# Patient Record
Sex: FEMALE | Race: WHITE | NOT HISPANIC OR LATINO | Employment: FULL TIME | URBAN - METROPOLITAN AREA
[De-identification: names, ages, dates, MRNs, and addresses within clinical notes are randomized per-mention and may not be internally consistent; named-entity substitution may affect disease eponyms.]

---

## 2017-09-07 ENCOUNTER — ALLSCRIPTS OFFICE VISIT (OUTPATIENT)
Dept: OTHER | Facility: OTHER | Age: 32
End: 2017-09-07

## 2017-09-07 LAB
EXTERNAL CHLAMYDIA SCREEN: NEGATIVE
EXTERNAL GONORRHEA SCREEN: NEGATIVE
HCG, QUALITATIVE (HISTORICAL): POSITIVE

## 2017-09-14 ENCOUNTER — GENERIC CONVERSION - ENCOUNTER (OUTPATIENT)
Dept: OTHER | Facility: OTHER | Age: 32
End: 2017-09-14

## 2017-09-14 ENCOUNTER — ALLSCRIPTS OFFICE VISIT (OUTPATIENT)
Dept: PERINATAL CARE | Facility: CLINIC | Age: 32
End: 2017-09-14
Payer: COMMERCIAL

## 2017-09-14 PROCEDURE — 76801 OB US < 14 WKS SINGLE FETUS: CPT | Performed by: OBSTETRICS & GYNECOLOGY

## 2017-09-20 ENCOUNTER — ALLSCRIPTS OFFICE VISIT (OUTPATIENT)
Dept: OTHER | Facility: OTHER | Age: 32
End: 2017-09-20

## 2017-09-20 DIAGNOSIS — Z3A.09 9 WEEKS GESTATION OF PREGNANCY: ICD-10-CM

## 2017-10-04 LAB
CFTR MUT ANL BLD/T: NEGATIVE
EXTERNAL ABO GROUPING: NORMAL
EXTERNAL ANTIBODY SCREEN: NORMAL
EXTERNAL HEMATOCRIT: 37.9 %
EXTERNAL HEMOGLOBIN: 13.1 G/DL
EXTERNAL HEPATITIS B SURFACE ANTIGEN: NEGATIVE
EXTERNAL HIV-1 ANTIBODY: NEGATIVE
EXTERNAL PLATELET COUNT: 214 K/ΜL
EXTERNAL RH FACTOR: POSITIVE
EXTERNAL RUBELLA IGG QUANTITATION: NORMAL
EXTERNAL SYPHILIS RPR SCREEN: NORMAL

## 2017-10-19 ENCOUNTER — GENERIC CONVERSION - ENCOUNTER (OUTPATIENT)
Dept: OTHER | Facility: OTHER | Age: 32
End: 2017-10-19

## 2017-10-26 NOTE — PROGRESS NOTES
Assessment  1  Amenorrhea (626 0) (N91 2)   2  Routine screening for STI (sexually transmitted infection) (V74 5) (Z11 3)    Plan  Amenorrhea    · Urine HCG- POC; Status:Resulted - Requires Verification,Retrospective By Protocol  Authorization;   Done: 76BPL0526 05:46PM   Performed: In Office; Due:89Jtz5404; Last Updated By:Flaca Bill; 9/7/2017 5:46:21 PM;Ordered; Today; For:Amenorrhea; Ordered By:Jo Priest;   · *1 - [address] and Ultrasound Only  Status:  Hold For - Scheduling  Requested for: 14Nsg8793   Ordered; For: Amenorrhea; Ordered By: Rickey Bradford Performed:  Due: 22NZC4656  Care Summary provided  : Yes   · Follow-up PRN Evaluation and Treatment  Follow-up  Status: Complete  Done:  95KFJ1893   Ordered; For: Amenorrhea; Ordered By: Rickey Bradford Performed:  Due: 85LMM7568   · Follow-up visit in 2 weeks Evaluation and Treatment  Follow-up  Status: Hold For -  Scheduling  Requested for: 32Tmf6800   Ordered; For: Amenorrhea; Ordered By: Rickey Bradford Performed:  Due: 63IAJ8294  Routine screening for STI (sexually transmitted infection)    · (Q) CHLAMYDIA/N  GONORRHOEAE DNA, SDA, PAP VIAL; Status:Active - Retrospective  By Protocol Authorization; Requested CPY:[id number];    Perform:[facility]; YRA:[id number]; Last Updated By:Flaca Bill; 9/7/2017 6:32:44 PM;Ordered; For:Routine screening for STI (sexually transmitted infection); Ordered By:Jo Priest; Discussion/Summary  Discussion Summary:   RTO in 2 weeks for FERN  Referred to [facility] for dating US and sequential screen to be done between 11 1-13 6 weeks  Will call with any worsening of nausea  Chief Complaint  Chief Complaint Free Text Note Form: Here for pregnancy confirmation  LMP 7/12/2017  History of Present Illness  HPI: New patient to office here with complaints of amenorrhea  Unplanned and acceptable  Accompanied by Lizette Chau  No health concerns   Menarche- 10, coitarche- 23 consensual, almost non consensual sex at age 25 while studying abroad  Lifetime # of sexual partners-4 both  No hx of STI  No hx of abnormal paps  LMP 7/12/17  [facility] 4/19/18  Today 8 1 weeks  Denies abnormal vaginal discharge or bleeding  Review of Systems  Focused-Female:   Constitutional: No fever, no chills, feels well, no tiredness, no recent weight gain or loss  ENT: no ear ache, no loss of hearing, no nosebleeds or nasal discharge, no sore throat or hoarseness  Cardiovascular: no complaints of slow or fast heart rate, no chest pain, no palpitations, no leg claudication or lower extremity edema  Respiratory: no complaints of shortness of breath, no wheezing, no dyspnea on exertion, no orthopnea or PND  Breasts: no complaints of breast pain, breast lump or nipple discharge  Gastrointestinal: no complaints of abdominal pain, no constipation, no nausea or diarrhea, no vomiting, no bloody stools  Genitourinary: no complaints of dysuria, no incontinence, no pelvic pain, no dysmenorrhea, no vaginal discharge or abnormal vaginal bleeding  Musculoskeletal: no complaints of arthralgia, no myalgia, no joint swelling or stiffness, no limb pain or swelling  Integumentary: no complaints of skin rash or lesion, no itching or dry skin, no skin wounds  Neurological: no complaints of headache, no confusion, no numbness or tingling, no dizziness or fainting  ROS Reviewed:   ROS reviewed  Active Problems  1  Eyelid dermatitis, eczematous (373 31) (H01 139)    Past Medical History  1  No pertinent past medical history  Active Problems And Past Medical History Reviewed: The active problems and past medical history were reviewed and updated today  Surgical History  1  History of Dental Surgery  Surgical History Reviewed: The surgical history was reviewed and updated today  Family History  Mother    1  Family history of Living and Healthy  Father    2   Family history of Living and Healthy  Maternal Grandmother    3  Family history of Cancer, colon  Family History Reviewed: The family history was reviewed and updated today  No family hx of breast or ovarian cancer  Social History   · Alcohol use (V49 89) (Z78 9)   · Caffeine use (V49 89) (F15 90)   · Daily caffeine consumption, 1 serving a day   · Exercises 5 to 6 times per week (V49 89) (Z78 9)   · Full-time employment   ·    · Never a smoker   · No drug use  Social History Reviewed: The social history was reviewed and updated today  Current Meds  Medication List Reviewed: The medication list was reviewed and updated today  Allergies  1  No Known Drug Allergies    Vitals  Vital Signs    Recorded: 10TNZ2331 22:13SH   Systolic 729, RUE, Sitting   Diastolic 60, RUE, Sitting   Height 5 ft 1 25 in   Weight 125 lb 6 4 oz   BMI Calculated 23 5   BSA Calculated 1 55   LMP 12MTH0726     Physical Exam    Constitutional   General appearance: No acute distress, well appearing and well nourished  Neck   Neck: Normal, supple, trachea midline, no masses  Thyroid: Normal, no thyromegaly  Pulmonary   Respiratory effort: No increased work of breathing or signs of respiratory distress  Auscultation of lungs: Clear to auscultation  Cardiovascular   Auscultation of heart: Normal rate and rhythm, normal S1 and S2, no murmurs  Peripheral vascular exam: Normal pulses Throughout  Genitourinary   External genitalia: Normal and no lesions appreciated  Vagina: Normal, no lesions or dryness appreciated  Urethra: Normal     Urethral meatus: Normal     Bladder: Normal, soft, non-tender and no prolapse or masses appreciated  Cervix: Normal, no palpable masses  -- GC/CT done  Uterus: Normal, non-tender, not enlarged, and no palpable masses  -- AV approx 9 weeks  Adnexa/parametria: Normal, non-tender and no fullness or masses appreciated  -- NT NP     Anus, perineum, and rectum: Normal sphincter tone, no masses, and no prolapse  Visually normal   Chest deferred  Abdomen   Abdomen: Normal, non-tender, and no organomegaly noted  Liver and spleen: No hepatomegaly or splenomegaly  Examination for hernias: No hernias appreciated  Lymphatic   Palpation of lymph nodes in neck, axillae, groin and/or other locations: No lymphadenopathy or masses noted  Skin   Skin and subcutaneous tissue: Normal skin turgor and no rashes  Palpation of skin and subcutaneous tissue: Normal     Psychiatric   Orientation to person, place, and time: Normal     Mood and affect: Normal        Results/Data  Urine HCG- POC 67SZA8625 05:46PM Joe Pino     Test Name Result Flag Reference   Urine HCG Positive                     Future Appointments    Date/Time Provider Specialty Site   09/20/2017 03:00 PM SIDRA Mckeon Obstetrics/Gynecology [facility] OB[facility]  [facility]   Electronically signed by :  SIDRA Donovan; Sep  7 2017  6:44PM EST                       (Author)    Electronically signed by : NARENDRA Del Cid ; Sep  7 2017  6:51PM EST                       (Author)

## 2017-11-16 ENCOUNTER — GENERIC CONVERSION - ENCOUNTER (OUTPATIENT)
Dept: OTHER | Facility: OTHER | Age: 32
End: 2017-11-16

## 2017-12-04 ENCOUNTER — APPOINTMENT (OUTPATIENT)
Dept: PERINATAL CARE | Facility: CLINIC | Age: 32
End: 2017-12-04
Payer: COMMERCIAL

## 2017-12-04 ENCOUNTER — GENERIC CONVERSION - ENCOUNTER (OUTPATIENT)
Dept: OTHER | Facility: OTHER | Age: 32
End: 2017-12-04

## 2017-12-04 PROCEDURE — 76811 OB US DETAILED SNGL FETUS: CPT | Performed by: OBSTETRICS & GYNECOLOGY

## 2017-12-04 PROCEDURE — 76817 TRANSVAGINAL US OBSTETRIC: CPT | Performed by: OBSTETRICS & GYNECOLOGY

## 2017-12-14 ENCOUNTER — GENERIC CONVERSION - ENCOUNTER (OUTPATIENT)
Dept: OTHER | Facility: OTHER | Age: 32
End: 2017-12-14

## 2017-12-29 ENCOUNTER — APPOINTMENT (OUTPATIENT)
Dept: PERINATAL CARE | Facility: CLINIC | Age: 32
End: 2017-12-29
Payer: COMMERCIAL

## 2017-12-29 ENCOUNTER — GENERIC CONVERSION - ENCOUNTER (OUTPATIENT)
Dept: OTHER | Facility: OTHER | Age: 32
End: 2017-12-29

## 2017-12-29 PROCEDURE — 76816 OB US FOLLOW-UP PER FETUS: CPT | Performed by: OBSTETRICS & GYNECOLOGY

## 2018-01-02 ENCOUNTER — OB ABSTRACT (OUTPATIENT)
Dept: GYNECOLOGY | Facility: CLINIC | Age: 33
End: 2018-01-02

## 2018-01-02 PROBLEM — O09.899 RUBELLA NON-IMMUNE STATUS, ANTEPARTUM: Status: ACTIVE | Noted: 2018-01-02

## 2018-01-02 PROBLEM — Z28.39 RUBELLA NON-IMMUNE STATUS, ANTEPARTUM: Status: ACTIVE | Noted: 2018-01-02

## 2018-01-02 RX ORDER — ACETAMINOPHEN 500 MG
500 TABLET ORAL EVERY 6 HOURS PRN
COMMUNITY
End: 2021-03-04 | Stop reason: ALTCHOICE

## 2018-01-10 NOTE — MISCELLANEOUS
Message  Return to work or school:   Margret Watson is under my professional care  She was seen in my office on 9/20/2017   Germán Willett is able to participate in prenatal yoga classes  If any questions, please feel free to call the office  Thank you,             Signatures  Familia Barrera, MSN, CRNP    Electronically signed by :  SIDRA Franco; Sep 25 2017  4:51PM EST                       (Author)

## 2018-01-10 NOTE — PROGRESS NOTES
SEP 14 2017         RE: Jamie Davis                                To: BERRY Delgado    MR#: [id number]                                   [address]   : [address]: [id number]                             Mariel Cantu U  6    (Exam #: [id number])                           Fax: [phone]      The LMP of this 36year old,  G1, P0-0-0-0 patient was 2017, giving   her an CARLOS A of 2018 and a current gestational age of 10 weeks 1 day   by dates  A sonographic examination was performed on SEP 14 2017 using   real time equipment  The ultrasound examination was performed using   abdominal technique  The patient has a BMI of 23 6  Her blood pressure   today was 124/75  Earliest US on record: 17  8w6d  18 CARLOS A      Billy Ruiz is on prenatal vitamins and denies any allergies to medication or   any use of cigarettes, alcohol or illicit drugs  She denies any   significant past medical, past surgical or first generation family history   of diabetes, hypertension, thrombosis or congenital anomalies  She is here   today for a dating scan  Multiple longitudinal and transverse sections revealed a saucedo   intrauterine pregnancy  A normal gestational sac was documented  A normal fetal pole was   visualized  Cardiac motion was observed at 173 bpm  The yolk sac was seen,   measuring 0 45 cm  The amnion was also documented  INDICATIONS      pregnancy dating      Exam Types      Level I      RESULTS      Fetus # 1 of 1   Fetal growth appeared normal      MEASUREMENTS (* Included In Average GA)      CRL              2 3 cm        8 weeks 6 days *      THE AVERAGE GESTATIONAL AGE is 8 weeks 6 days +/- 5 days  ADNEXA      The left ovary was enlarged and measured 5 8 x 4 7 x 4 2 cm with a volume   of 59 9 cc   The right ovary appeared normal and measured 2 6 x 2 3 x 1 4   cm with a volume of 4 4 cc       MASSES      1)  Left ovarian cystic mass  The cyst measured 4 50 x 3 30 x 4 60 cm,   simple in appearance with a smooth lining  Sonolucent fluid  Color doppler   flow was performed  This resembles a physiologic corpus luteum cyst       AMNIOTIC FLUID         Largest Vertical Pocket = 2 4 cm      IMPRESSION      Berry IUP   8 weeks and 6 days by this ultrasound  (CARLOS A=APR 20 2018)   Fetal growth appeared normal   Regular fetal heart rate of 173 bpm      GENERAL COMMENT      She is interested in genetic screening if her insurance covers it  My   staff gave her printed pamphlets on the sequential screen and information   needed to contact her insurance to assess if this is covered  She was   scheduled to return for a 12 week ultrasound for nuchal translucency and a   20 week dating scan  SAADIA De La Cruz M D     Maternal-Fetal Medicine   Electronically signed 09/14/17 19:19

## 2018-01-13 VITALS
DIASTOLIC BLOOD PRESSURE: 60 MMHG | HEIGHT: 61 IN | SYSTOLIC BLOOD PRESSURE: 120 MMHG | WEIGHT: 125.4 LBS | BODY MASS INDEX: 23.68 KG/M2

## 2018-01-13 VITALS
WEIGHT: 125 LBS | DIASTOLIC BLOOD PRESSURE: 75 MMHG | HEIGHT: 61 IN | BODY MASS INDEX: 23.6 KG/M2 | SYSTOLIC BLOOD PRESSURE: 124 MMHG

## 2018-01-15 VITALS
SYSTOLIC BLOOD PRESSURE: 116 MMHG | DIASTOLIC BLOOD PRESSURE: 62 MMHG | WEIGHT: 126.13 LBS | HEIGHT: 61 IN | BODY MASS INDEX: 23.81 KG/M2

## 2018-01-16 ENCOUNTER — GENERIC CONVERSION - ENCOUNTER (OUTPATIENT)
Dept: OTHER | Facility: OTHER | Age: 33
End: 2018-01-16

## 2018-01-16 DIAGNOSIS — Z3A.26 26 WEEKS GESTATION OF PREGNANCY: ICD-10-CM

## 2018-01-22 VITALS
BODY MASS INDEX: 25.77 KG/M2 | SYSTOLIC BLOOD PRESSURE: 122 MMHG | HEART RATE: 82 BPM | DIASTOLIC BLOOD PRESSURE: 64 MMHG | HEIGHT: 61 IN | WEIGHT: 136.5 LBS

## 2018-01-22 VITALS
SYSTOLIC BLOOD PRESSURE: 128 MMHG | OXYGEN SATURATION: 99 % | HEIGHT: 61 IN | DIASTOLIC BLOOD PRESSURE: 74 MMHG | HEART RATE: 74 BPM | WEIGHT: 130.25 LBS | BODY MASS INDEX: 24.59 KG/M2

## 2018-01-23 NOTE — PROGRESS NOTES
DEC 29 2017         RE: Ena Mcmanus                                To: [facility]   MR#: [id number]                                   [facility]  : Jose Elias: [id number]                             [facility], [address]   (Exam #: [id number])                           Fax: [phone]      The LMP of this 29year old,  G1, P0-0-0-0 patient was 2017, giving   her an CARLOS A of 2018 and a current gestational age of 23 weeks 2 days   by dates  A sonographic examination was performed on DEC 29 2017 using   real time equipment  The ultrasound examination was performed using   abdominal technique  The patient has a BMI of 26 9  Her blood pressure   today was 131/81, with a pulse of 90 bpm       Earliest US on record: 17  8w6d  18 CARLOS A      Cardiac motion was observed at 145 bpm       INDICATIONS      missed anatomy follow up      Exam Types      Level I      RESULTS      Fetus # 1 of 1   Breech presentation   Placenta Location = Posterior   No placenta previa   Placenta Grade = I      AMNIOTIC FLUID      Q1: 4 0      Q2: 7 1      Q3: 3 0      Q4: 1 9   THA Total = 16 0 cm   Amniotic Fluid: Normal      ANATOMY COMMENTS      The prior fetal anatomic survey was limited the area of the profile and   cavum  These anatomic views were seen today as sonographically normal   within the inherent limitations of fetal ultrasound and the anatomic   survey is now complete  IMPRESSION      Berry IUP   Breech presentation   Regular fetal heart rate of 145 bpm   Posterior placenta   No placenta previa      GENERAL COMMENT      Ms Olivia Lezama is here for followup anatomy  There is a single live intrauterine pregnancy  No gross anomalies were identified on limited views  Specifically the   cavum is seen as sonographically normal as well as the profile and chin        Amniotic fluid is within normal limits  Evaluation and Management:   The patient was counseled regarding the above findings  A total of 10   minutes were spent in this encounter with >50% of the time spent in   face-to-face counseling and in coordination of care  The limitations of   ultrasound were reviewed  I reviewed that my suspicion today for any structural abnormality is very   low  She can continue pregnancy with expectant management without further   ultrasounds unless an additional indication or concern arises  At the conclusion of today's encounter, all questions were answered to her   satisfaction  Thank you very much for this kind referral and please do   not hesitate to contact me with any further questions or concerns  SAADIA Ivy M D     Maternal Fetal Medicine   Electronically signed 12/29/17 12:55

## 2018-01-23 NOTE — PROGRESS NOTES
DEC 4 2017         RE: Benito Gamble                                To: Cleo Roque,   N ODESSA    MR#: [id number]                                   [address]   : [address]: [id number]:[facility]                             Mariel Cantu U  6    (Exam #: [id number])                           Fax: [phone]      The LMP of this 29year old,  G1, P0-0-0-0 patient was 2017, giving   her an CARLOS A of 2018 and a current gestational age of 25 weeks 5 days   by dates  A sonographic examination was performed on DEC 4 2017 using real   time equipment  The ultrasound examination was performed using abdominal &   vaginal techniques  The patient has a BMI of 25 6  Her blood pressure   today was 123/76  Earliest US on record: 17  8w6d  18 CARLOS A      Cardiac motion was observed at 149 bpm       INDICATIONS      fetal anatomical survey      Exam Types      LEVEL II   Transvaginal      RESULTS      Fetus # 1 of 1   Vertex presentation   Fetal growth appeared normal   Placenta Location = Posterior   Placenta Grade = II      MEASUREMENTS (* Included In Average GA)      AC              15 9 cm        20 weeks 5 days* (50%)   BPD              4 9 cm        20 weeks 5 days* (48%)   HC              18 4 cm        20 weeks 5 days* (47%)   Femur            3 3 cm        20 weeks 4 days* (39%)      Nuchal Fold      3 2 mm   NBL              6 2 mm      Humerus          3 3 cm        21 weeks 1 day   (57%)      Cerebellum       2 1 cm        20 weeks 6 days   Biorbit          3 2 cm        20 weeks 6 days   CisternaMagna    7 6 mm      HC/AC           1 16   FL/AC           0 21   FL/BPD          0 68   EFW (Ac/Fl/Hc)   369 grams - 0 lbs 13 oz      THE AVERAGE GESTATIONAL AGE is 20 weeks 5 days +/- 10 days        AMNIOTIC FLUID         Largest Vertical Pocket = 5 5 cm   Amniotic Fluid: Normal      CERVICAL EVALUATION      The cervix appeared normal (Ultrasound Examination)  SUPINE      Cervical Length: 3 49 cm      OTHER TEST RESULTS           Funneling?: No             Dynamic Changes?: No        Resp  To TFP?: No      ANATOMY      Head                                    See Details   Face/Neck                               Normal   Th  Cav  Normal   Heart                                   Normal   Abd  Cav  Normal   Stomach                                 Normal   Right Kidney                            Normal   Left Kidney                             Normal   Bladder                                 Normal   Abd  Wall                               Normal   Spine                                   Normal   Extrems                                 Normal   Genitalia                               Normal   Placenta                                Normal   Umbl  Cord                              Normal   Uterus                                  Normal   PCI                                     Normal      ANATOMY DETAILS      Visualized Appearing Sonographically Normal:   HEAD: (Calvarium, BPD Level, Lateral Ventricles, Choroid Plexus,   Cerebellum, Cisterna Magna);    FACE/NECK: (Neck, Nuchal Fold, Profile,   Orbits, Nose/Lips, Palate, Face);    TH  CAV  : (Lungs, Diaphragm); HEART: (Four Chamber View, Proximal Left Outflow, Proximal Right Outflow,   3VV, 3 Vessel Trachea, Short Axis of Greater Vessels, Ductal Arch, Aortic   Arch, Interventricular Septum, Interatrial Septum, IVC, SVC, Cardiac Axis,   Cardiac Position);    ABD  CAV : (Liver);    STOMACH, RIGHT KIDNEY, LEFT   KIDNEY, BLADDER, ABD  WALL, SPINE: (Cervical Spine, Thoracic Spine, Lumbar   Spine, Sacrum);    EXTREMS: (Lt Humerus, Rt Humerus, Lt Forearm, Rt   Forearm, Lt Hand, Rt Hand, Lt Femur, Rt Femur, Lt Low Leg, Rt Low Leg, Lt   Foot, Rt Foot);    GENITALIA (Male), PLACENTA, UMBL   CORD, UTERUS, PCI      Suboptimally Visualized:   HEAD: (Cavum)      ADNEXA      The left ovary appeared normal and measured 1 6 x 1 9 x 1 7 cm with a   volume of 2 7 cc  The right ovary was not visualized  IMPRESSION      Berry IUP   20 weeks and 5 days by this ultrasound  (CARLOS A=2018)   Vertex presentation   Fetal growth appeared normal   Regular fetal heart rate of 149 bpm   Posterior placenta      GENERAL COMMENT      Ms Gary Ha is here for anatomy survey and cervical length screening  There is a single live intrauterine pregnancy with size equivalent to   dates  No gross anomalies were identified however the anatomy survey is   incomplete (CSP appears present though not imaged optimally)  Note is   made of a slightly small chin and slightly prominent nose (seen in profile   view) however there are no other abnormalities today  On realtime imaging   the chin appears normal in size, not consistent with micrognathia, and the   fetal nose again appears slightly prominent  The nasal bone is present  There are no obvious abnormalities of the fetal ears or eyes nor of the   major organ systems including heart, abdominal wall, spine  There are no   obvious amniotic bands  Amniotic fluid is within normal limits  Transvaginal cervical length measures 35mm indicating low risk for   spontaneous  birth  Evaluation and Management:   The patient was counseled regarding the above findings  A total of 10   minutes were spent in this encounter with >50% of the time spent in   face-to-face counseling and in coordination of care  The limitations of   ultrasound were reviewed  In the absence of obvious major abnormalities, the significance of a   slightly small chin and slightly enlarged nose is uncertain  We briefly   discussed the differential diagnosis including trisomy 18 and amniotic   band syndrome; neither of these are supported by the results of the   remainder of the anatomy ultrasound  Micrognathia is a component of   certain syndromes such as Davee Lucio syndrome, Treacher Maciel   syndrome, and Banner [place] de Espinosa syndrome, 22q11 deletion syndrome, and I   do not suspect that one of these is present  I offered genetic counseling   and she was given the contact information for Eduard Sifuentes should she   opt in for screening or diagnostic testing  I offered to reevaluate the facial appearance in 4 weeks  At the conclusion of today's encounter, all questions were answered to her   satisfaction  Thank you very much for this kind referral and please do   not hesitate to contact me with any further questions or concerns  SAADIA Bradford M D     Maternal Fetal Medicine   Electronically signed 12/04/17 16:54

## 2018-01-24 VITALS
DIASTOLIC BLOOD PRESSURE: 81 MMHG | SYSTOLIC BLOOD PRESSURE: 131 MMHG | HEART RATE: 90 BPM | BODY MASS INDEX: 26.88 KG/M2 | WEIGHT: 146.05 LBS | HEIGHT: 62 IN

## 2018-01-24 VITALS
DIASTOLIC BLOOD PRESSURE: 70 MMHG | SYSTOLIC BLOOD PRESSURE: 112 MMHG | BODY MASS INDEX: 25.95 KG/M2 | HEIGHT: 62 IN | WEIGHT: 141 LBS

## 2018-01-24 VITALS
HEART RATE: 90 BPM | DIASTOLIC BLOOD PRESSURE: 76 MMHG | SYSTOLIC BLOOD PRESSURE: 123 MMHG | BODY MASS INDEX: 25.77 KG/M2 | HEIGHT: 62 IN | WEIGHT: 140.05 LBS

## 2018-01-24 VITALS
BODY MASS INDEX: 28.02 KG/M2 | WEIGHT: 152.25 LBS | DIASTOLIC BLOOD PRESSURE: 70 MMHG | HEIGHT: 62 IN | SYSTOLIC BLOOD PRESSURE: 116 MMHG

## 2018-02-05 ENCOUNTER — ROUTINE PRENATAL (OUTPATIENT)
Dept: GYNECOLOGY | Facility: CLINIC | Age: 33
End: 2018-02-05
Payer: COMMERCIAL

## 2018-02-05 VITALS — SYSTOLIC BLOOD PRESSURE: 138 MMHG | WEIGHT: 155.4 LBS | BODY MASS INDEX: 28.42 KG/M2 | DIASTOLIC BLOOD PRESSURE: 76 MMHG

## 2018-02-05 DIAGNOSIS — Z3A.29 29 WEEKS GESTATION OF PREGNANCY: Primary | ICD-10-CM

## 2018-02-05 DIAGNOSIS — Z23 NEED FOR TDAP VACCINATION: ICD-10-CM

## 2018-02-05 PROCEDURE — 90472 IMMUNIZATION ADMIN EACH ADD: CPT

## 2018-02-05 PROCEDURE — 90715 TDAP VACCINE 7 YRS/> IM: CPT

## 2018-02-05 PROCEDURE — PNV: Performed by: OBSTETRICS & GYNECOLOGY

## 2018-02-05 RX ORDER — FAMOTIDINE 20 MG/1
20 TABLET, FILM COATED ORAL DAILY
COMMUNITY
End: 2021-03-04 | Stop reason: ALTCHOICE

## 2018-02-06 ENCOUNTER — TELEPHONE (OUTPATIENT)
Dept: GYNECOLOGY | Facility: CLINIC | Age: 33
End: 2018-02-06

## 2018-02-06 DIAGNOSIS — R73.09 ELEVATED GLUCOSE: Primary | ICD-10-CM

## 2018-02-06 NOTE — TELEPHONE ENCOUNTER
Spoke with patient and made aware of test results and need for 3 hour GTT after 10-12 hour fast  Order in EMR

## 2018-02-06 NOTE — TELEPHONE ENCOUNTER
Patient states she knows we have not received her lab results yet but she was able to log into the [facility] website and she was able to see her results  States everything looks normal except her glucose is a little high  She is requesting to speak with Dr Leon Woodard about the results

## 2018-02-06 NOTE — TELEPHONE ENCOUNTER
Pt would like to speak with you  If so before 4pm  States her glucose is at 144  (saw this through her health network portal) they are faxing us the results   Please call

## 2018-02-19 ENCOUNTER — ROUTINE PRENATAL (OUTPATIENT)
Dept: GYNECOLOGY | Facility: CLINIC | Age: 33
End: 2018-02-19

## 2018-02-19 VITALS — WEIGHT: 158.4 LBS | SYSTOLIC BLOOD PRESSURE: 122 MMHG | BODY MASS INDEX: 28.97 KG/M2 | DIASTOLIC BLOOD PRESSURE: 72 MMHG

## 2018-02-19 DIAGNOSIS — Z3A.31 31 WEEKS GESTATION OF PREGNANCY: Primary | ICD-10-CM

## 2018-02-19 PROCEDURE — PNV: Performed by: NURSE PRACTITIONER
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
## 2018-03-12 PROBLEM — Z3A.31 31 WEEKS GESTATION OF PREGNANCY: Status: RESOLVED | Noted: 2018-02-19 | Resolved: 2018-03-12

## 2018-03-12 PROBLEM — Z3A.34 34 WEEKS GESTATION OF PREGNANCY: Status: ACTIVE | Noted: 2018-03-12

## 2018-03-13 ENCOUNTER — ROUTINE PRENATAL (OUTPATIENT)
Dept: GYNECOLOGY | Facility: CLINIC | Age: 33
End: 2018-03-13

## 2018-03-13 VITALS
SYSTOLIC BLOOD PRESSURE: 120 MMHG | WEIGHT: 163 LBS | DIASTOLIC BLOOD PRESSURE: 70 MMHG | HEIGHT: 62 IN | BODY MASS INDEX: 30 KG/M2 | HEART RATE: 96 BPM | OXYGEN SATURATION: 98 %

## 2018-03-13 DIAGNOSIS — Z3A.34 34 WEEKS GESTATION OF PREGNANCY: Primary | ICD-10-CM

## 2018-03-13 PROCEDURE — PNV: Performed by: OBSTETRICS & GYNECOLOGY

## 2018-03-13 NOTE — PROGRESS NOTES
Doing well without complaints  Has been receiving prenatal classes at her Replaced by [facility]'s  Labor instructions given, GBS explained  Circumcision discussed    3 hour GTT was normal   Reviewed diet due to her for 4 5 lb weight gain- seems reasonable

## 2018-03-20 ENCOUNTER — ROUTINE PRENATAL (OUTPATIENT)
Dept: GYNECOLOGY | Facility: CLINIC | Age: 33
End: 2018-03-20

## 2018-03-20 VITALS
SYSTOLIC BLOOD PRESSURE: 124 MMHG | DIASTOLIC BLOOD PRESSURE: 80 MMHG | HEART RATE: 103 BPM | BODY MASS INDEX: 30.73 KG/M2 | WEIGHT: 167 LBS | HEIGHT: 62 IN

## 2018-03-20 DIAGNOSIS — Z3A.35 35 WEEKS GESTATION OF PREGNANCY: Primary | ICD-10-CM

## 2018-03-20 PROBLEM — Z3A.34 34 WEEKS GESTATION OF PREGNANCY: Status: RESOLVED | Noted: 2018-03-12 | Resolved: 2018-03-20

## 2018-03-20 PROCEDURE — 87653 STREP B DNA AMP PROBE: CPT | Performed by: NURSE PRACTITIONER

## 2018-03-20 PROCEDURE — PNV: Performed by: NURSE PRACTITIONER

## 2018-03-20 NOTE — PROGRESS NOTES
Accompanied by Doyle Collet  4 lb weight gain since last week  States that she drinks a "giant smoothy and water" just before she comes in  Believes she eats well  Feels her abdomen and breasts has grown significantly this week  Takes pepcid only at night  Tolerable heartburn       GBS done today  RT non immune

## 2018-03-22 LAB — GP B STREP DNA SPEC QL NAA+PROBE: ABNORMAL

## 2018-03-29 ENCOUNTER — ROUTINE PRENATAL (OUTPATIENT)
Dept: GYNECOLOGY | Facility: CLINIC | Age: 33
End: 2018-03-29

## 2018-03-29 VITALS — WEIGHT: 165.4 LBS | SYSTOLIC BLOOD PRESSURE: 122 MMHG | DIASTOLIC BLOOD PRESSURE: 68 MMHG | BODY MASS INDEX: 30.25 KG/M2

## 2018-03-29 DIAGNOSIS — Z22.330 GBS CARRIER: ICD-10-CM

## 2018-03-29 DIAGNOSIS — Z3A.37 37 WEEKS GESTATION OF PREGNANCY: Primary | ICD-10-CM

## 2018-03-29 PROBLEM — Z3A.35 35 WEEKS GESTATION OF PREGNANCY: Status: RESOLVED | Noted: 2018-03-20 | Resolved: 2018-03-29

## 2018-03-29 PROCEDURE — PNV: Performed by: OBSTETRICS & GYNECOLOGY

## 2018-03-29 RX ORDER — DIPHENHYDRAMINE HCL 25 MG
25 CAPSULE ORAL EVERY 6 HOURS PRN
COMMUNITY
End: 2021-03-04 | Stop reason: ALTCHOICE

## 2018-03-29 NOTE — PROGRESS NOTES
Jonas Gains is without complaints  Fetal movement is normal  She did not have her usual amount of water today and smoothie and she actually lost weight  L&D instructions given  She is GBS positive and this was explained  Birth plan was partially discussed because it was left at home  Everything she remembers sounds reasonable

## 2018-03-29 NOTE — PROGRESS NOTES
Patient is GBS positive and already had her flu and tdap shot  Occas  swelling in her hands and ankles

## 2018-04-05 ENCOUNTER — ROUTINE PRENATAL (OUTPATIENT)
Dept: GYNECOLOGY | Facility: CLINIC | Age: 33
End: 2018-04-05

## 2018-04-05 VITALS — SYSTOLIC BLOOD PRESSURE: 118 MMHG | WEIGHT: 168.8 LBS | BODY MASS INDEX: 30.87 KG/M2 | DIASTOLIC BLOOD PRESSURE: 76 MMHG

## 2018-04-05 DIAGNOSIS — Z3A.38 38 WEEKS GESTATION OF PREGNANCY: Primary | ICD-10-CM

## 2018-04-05 PROCEDURE — PNV: Performed by: NURSE PRACTITIONER

## 2018-04-05 NOTE — PROGRESS NOTES
Ready for the pregnancy to be over  Increased aches and pains  Massage encouraged  Rare contractions  Plans to use Dr Joan Sanchez as a pediatrician

## 2018-04-05 NOTE — PROGRESS NOTES
Patient c/o swelling in her legs and ankles (notices this at the end of the day) Already had flu and tdap shot  GBS positive

## 2018-04-12 ENCOUNTER — ROUTINE PRENATAL (OUTPATIENT)
Dept: GYNECOLOGY | Facility: CLINIC | Age: 33
End: 2018-04-12

## 2018-04-12 VITALS
HEART RATE: 104 BPM | SYSTOLIC BLOOD PRESSURE: 112 MMHG | DIASTOLIC BLOOD PRESSURE: 70 MMHG | WEIGHT: 168.8 LBS | BODY MASS INDEX: 30.87 KG/M2

## 2018-04-12 DIAGNOSIS — Z3A.39 39 WEEKS GESTATION OF PREGNANCY: Primary | ICD-10-CM

## 2018-04-12 DIAGNOSIS — Z22.330 GBS CARRIER: ICD-10-CM

## 2018-04-12 PROBLEM — Z3A.38 38 WEEKS GESTATION OF PREGNANCY: Status: RESOLVED | Noted: 2018-04-05 | Resolved: 2018-04-12

## 2018-04-12 PROBLEM — Z3A.37 37 WEEKS GESTATION OF PREGNANCY: Status: RESOLVED | Noted: 2018-03-29 | Resolved: 2018-04-12

## 2018-04-12 PROCEDURE — PNV: Performed by: OBSTETRICS & GYNECOLOGY

## 2018-04-12 NOTE — PROGRESS NOTES
Paralee Bile is "done "  Really would like to pregnancy to be over-having difficulty sleeping, sharing her body and is just excited about meeting her son  She is very reasonable  I explained that this is the last time they will be a couple together and right now she has more freedom then she will after the  Zeke Diaz is born  I encouraged them to make the most of this time  A pregnancy massaged, warm baths were recommended as well as a single glass of wine sparingly for painful night time contractions  Her birthing plan was reviewed and everything is reasonable  Vacuum and forceps deliveries were fully explained as was a  section from both a maternal and fetal aspect  Risk of  and maternal damage were explained-facial or scalp lacerations or hematomas, long bone fractures, skull fractures, face laceration at  section  Caput and molding were explained and are reasons sometimes for an operative vaginal delivery and not caused from it  All questions were answered  They are fine with the use of operative vaginal delivery under the appropriate circumstances and with the ability to have further dialogue again at that time  Visit lasted over 30 minutes  Very nice couple  This was the 2nd time I performed a pelvic exam but obviously did not document it  She was closed, thick, soft, and high before  The -3 station currently is a red flag for dystocia

## 2018-04-17 PROBLEM — Z3A.39 39 WEEKS GESTATION OF PREGNANCY: Status: RESOLVED | Noted: 2018-04-12 | Resolved: 2018-04-17

## 2018-04-17 PROBLEM — Z3A.40 40 WEEKS GESTATION OF PREGNANCY: Status: ACTIVE | Noted: 2018-04-17

## 2018-04-18 ENCOUNTER — ROUTINE PRENATAL (OUTPATIENT)
Dept: GYNECOLOGY | Facility: CLINIC | Age: 33
End: 2018-04-18

## 2018-04-18 VITALS
BODY MASS INDEX: 31.28 KG/M2 | WEIGHT: 170 LBS | HEIGHT: 62 IN | HEART RATE: 88 BPM | SYSTOLIC BLOOD PRESSURE: 118 MMHG | DIASTOLIC BLOOD PRESSURE: 70 MMHG

## 2018-04-18 DIAGNOSIS — Z22.330 GBS CARRIER: ICD-10-CM

## 2018-04-18 DIAGNOSIS — Z28.39 RUBELLA NON-IMMUNE STATUS, ANTEPARTUM: ICD-10-CM

## 2018-04-18 DIAGNOSIS — O09.899 RUBELLA NON-IMMUNE STATUS, ANTEPARTUM: ICD-10-CM

## 2018-04-18 DIAGNOSIS — Z3A.40 40 WEEKS GESTATION OF PREGNANCY: Primary | ICD-10-CM

## 2018-04-18 PROCEDURE — PNV: Performed by: OBSTETRICS & GYNECOLOGY

## 2018-04-22 ENCOUNTER — ANESTHESIA (INPATIENT)
Dept: LABOR AND DELIVERY | Facility: HOSPITAL | Age: 33
End: 2018-04-22
Payer: COMMERCIAL

## 2018-04-22 ENCOUNTER — HOSPITAL ENCOUNTER (INPATIENT)
Facility: HOSPITAL | Age: 33
LOS: 5 days | Discharge: HOME | End: 2018-04-27
Attending: OBSTETRICS & GYNECOLOGY | Admitting: OBSTETRICS & GYNECOLOGY
Payer: COMMERCIAL

## 2018-04-22 ENCOUNTER — ANESTHESIA EVENT (INPATIENT)
Dept: LABOR AND DELIVERY | Facility: HOSPITAL | Age: 33
End: 2018-04-22
Payer: COMMERCIAL

## 2018-04-22 DIAGNOSIS — Z3A.40 40 WEEKS GESTATION OF PREGNANCY: ICD-10-CM

## 2018-04-22 LAB
ABO GROUP BLD: NORMAL
BASOPHILS # BLD AUTO: 0.03 THOUSANDS/ΜL (ref 0–0.1)
BASOPHILS NFR BLD AUTO: 0 % (ref 0–1)
BLD GP AB SCN SERPL QL: NEGATIVE
EOSINOPHIL # BLD AUTO: 0.02 THOUSAND/ΜL (ref 0–0.61)
EOSINOPHIL NFR BLD AUTO: 0 % (ref 0–6)
ERYTHROCYTE [DISTWIDTH] IN BLOOD BY AUTOMATED COUNT: 13.3 % (ref 11.6–15.1)
HCT VFR BLD AUTO: 38.5 % (ref 34.8–46.1)
HGB BLD-MCNC: 13 G/DL (ref 11.5–15.4)
LYMPHOCYTES # BLD AUTO: 2.1 THOUSANDS/ΜL (ref 0.6–4.47)
LYMPHOCYTES NFR BLD AUTO: 16 % (ref 14–44)
MCH RBC QN AUTO: 32.4 PG (ref 26.8–34.3)
MCHC RBC AUTO-ENTMCNC: 33.8 G/DL (ref 31.4–37.4)
MCV RBC AUTO: 96 FL (ref 82–98)
MONOCYTES # BLD AUTO: 0.92 THOUSAND/ΜL (ref 0.17–1.22)
MONOCYTES NFR BLD AUTO: 7 % (ref 4–12)
NEUTROPHILS # BLD AUTO: 10.01 THOUSANDS/ΜL (ref 1.85–7.62)
NEUTS SEG NFR BLD AUTO: 77 % (ref 43–75)
NRBC BLD AUTO-RTO: 0 /100 WBCS
PLATELET # BLD AUTO: 167 THOUSANDS/UL (ref 149–390)
PMV BLD AUTO: 11.8 FL (ref 8.9–12.7)
RBC # BLD AUTO: 4.01 MILLION/UL (ref 3.81–5.12)
RH BLD: POSITIVE
SPECIMEN EXPIRATION DATE: NORMAL
WBC # BLD AUTO: 13.13 THOUSAND/UL (ref 4.31–10.16)

## 2018-04-22 PROCEDURE — 86900 BLOOD TYPING SEROLOGIC ABO: CPT | Performed by: STUDENT IN AN ORGANIZED HEALTH CARE EDUCATION/TRAINING PROGRAM

## 2018-04-22 PROCEDURE — 99214 OFFICE O/P EST MOD 30 MIN: CPT

## 2018-04-22 PROCEDURE — 85025 COMPLETE CBC W/AUTO DIFF WBC: CPT | Performed by: STUDENT IN AN ORGANIZED HEALTH CARE EDUCATION/TRAINING PROGRAM

## 2018-04-22 PROCEDURE — 86592 SYPHILIS TEST NON-TREP QUAL: CPT | Performed by: STUDENT IN AN ORGANIZED HEALTH CARE EDUCATION/TRAINING PROGRAM

## 2018-04-22 PROCEDURE — 86901 BLOOD TYPING SEROLOGIC RH(D): CPT | Performed by: STUDENT IN AN ORGANIZED HEALTH CARE EDUCATION/TRAINING PROGRAM

## 2018-04-22 PROCEDURE — 86850 RBC ANTIBODY SCREEN: CPT | Performed by: STUDENT IN AN ORGANIZED HEALTH CARE EDUCATION/TRAINING PROGRAM

## 2018-04-22 RX ORDER — SODIUM CHLORIDE, SODIUM LACTATE, POTASSIUM CHLORIDE, CALCIUM CHLORIDE 600; 310; 30; 20 MG/100ML; MG/100ML; MG/100ML; MG/100ML
125 INJECTION, SOLUTION INTRAVENOUS CONTINUOUS
Status: DISCONTINUED | OUTPATIENT
Start: 2018-04-22 | End: 2018-04-23

## 2018-04-22 RX ORDER — OXYTOCIN/RINGER'S LACTATE 30/500 ML
PLASTIC BAG, INJECTION (ML) INTRAVENOUS
Status: COMPLETED
Start: 2018-04-22 | End: 2018-04-22

## 2018-04-22 RX ORDER — LIDOCAINE HYDROCHLORIDE AND EPINEPHRINE 15; 5 MG/ML; UG/ML
INJECTION, SOLUTION EPIDURAL AS NEEDED
Status: DISCONTINUED | OUTPATIENT
Start: 2018-04-22 | End: 2018-04-23 | Stop reason: SURG

## 2018-04-22 RX ORDER — ONDANSETRON 2 MG/ML
4 INJECTION INTRAMUSCULAR; INTRAVENOUS EVERY 6 HOURS PRN
Status: DISCONTINUED | OUTPATIENT
Start: 2018-04-22 | End: 2018-04-23 | Stop reason: SDUPTHER

## 2018-04-22 RX ORDER — CALCIUM CARBONATE 200(500)MG
500 TABLET,CHEWABLE ORAL DAILY PRN
Status: DISCONTINUED | OUTPATIENT
Start: 2018-04-22 | End: 2018-04-23

## 2018-04-22 RX ORDER — OXYTOCIN/RINGER'S LACTATE 30/500 ML
1-30 PLASTIC BAG, INJECTION (ML) INTRAVENOUS
Status: DISCONTINUED | OUTPATIENT
Start: 2018-04-22 | End: 2018-04-23

## 2018-04-22 RX ORDER — TRISODIUM CITRATE DIHYDRATE AND CITRIC ACID MONOHYDRATE 500; 334 MG/5ML; MG/5ML
30 SOLUTION ORAL ONCE
Status: COMPLETED | OUTPATIENT
Start: 2018-04-22 | End: 2018-04-22

## 2018-04-22 RX ADMIN — SODIUM CHLORIDE, SODIUM LACTATE, POTASSIUM CHLORIDE, AND CALCIUM CHLORIDE 125 ML/HR: .6; .31; .03; .02 INJECTION, SOLUTION INTRAVENOUS at 14:49

## 2018-04-22 RX ADMIN — SODIUM CITRATE AND CITRIC ACID MONOHYDRATE 30 ML: 500; 334 SOLUTION ORAL at 20:40

## 2018-04-22 RX ADMIN — ROPIVACAINE HYDROCHLORIDE: 2 INJECTION, SOLUTION EPIDURAL; INFILTRATION at 16:30

## 2018-04-22 RX ADMIN — SODIUM CHLORIDE, SODIUM LACTATE, POTASSIUM CHLORIDE, AND CALCIUM CHLORIDE 125 ML/HR: .6; .31; .03; .02 INJECTION, SOLUTION INTRAVENOUS at 10:50

## 2018-04-22 RX ADMIN — SODIUM CHLORIDE 2.5 MILLION UNITS: 9 INJECTION, SOLUTION INTRAVENOUS at 22:27

## 2018-04-22 RX ADMIN — SODIUM CHLORIDE 2.5 MILLION UNITS: 9 INJECTION, SOLUTION INTRAVENOUS at 14:38

## 2018-04-22 RX ADMIN — LIDOCAINE HYDROCHLORIDE AND EPINEPHRINE 5 ML: 15; 5 INJECTION, SOLUTION EPIDURAL at 16:11

## 2018-04-22 RX ADMIN — Medication 2 MILLI-UNITS/MIN: at 20:46

## 2018-04-22 RX ADMIN — ONDANSETRON 4 MG: 2 INJECTION INTRAMUSCULAR; INTRAVENOUS at 17:59

## 2018-04-22 RX ADMIN — SODIUM CHLORIDE, SODIUM LACTATE, POTASSIUM CHLORIDE, AND CALCIUM CHLORIDE 125 ML/HR: .6; .31; .03; .02 INJECTION, SOLUTION INTRAVENOUS at 18:01

## 2018-04-22 RX ADMIN — SODIUM CHLORIDE 2.5 MILLION UNITS: 9 INJECTION, SOLUTION INTRAVENOUS at 18:58

## 2018-04-22 RX ADMIN — Medication 500 MG: at 19:47

## 2018-04-22 RX ADMIN — SODIUM CHLORIDE 5 MILLION UNITS: 0.9 INJECTION, SOLUTION INTRAVENOUS at 10:53

## 2018-04-22 RX ADMIN — ROPIVACAINE HYDROCHLORIDE: 2 INJECTION, SOLUTION EPIDURAL; INFILTRATION at 23:45

## 2018-04-22 NOTE — ANESTHESIA PROCEDURE NOTES
Epidural Block    Patient location during procedure: OB  Start time: 4/22/2018 4:01 PM  Reason for block: procedure for pain, at surgeon's request and primary anesthetic  Staffing  Anesthesiologist: CHANTELL SULLIVAN  Performed: anesthesiologist   Preanesthetic Checklist  Completed: patient identified, site marked, surgical consent, pre-op evaluation, timeout performed, IV checked, risks and benefits discussed and monitors and equipment checked  Epidural  Patient position: sitting  Prep: Betadine  Patient monitoring: heart rate, cardiac monitor, continuous pulse ox and frequent blood pressure checks  Approach: midline  Location: lumbar (1-5)  Injection technique: AKANKSHA air  Needle  Needle type: Tuohy   Needle gauge: 18 G  Test dose: lidocaine 1 5% with epinephrine 1-to-200,000 and negative  Assessment  Sensory level: [id number] aspiration for CSF, negative aspiration for heme and no paresthesia on injection  patient tolerated the procedure well with no immediate complications  Additional Notes  One attempt, L3-4, AKANKSHA at 5 cm, taped to skin at 11 cm

## 2018-04-22 NOTE — PROGRESS NOTES
Triage Note - OB  Marixa Terrazas 29 y o  female MRN: [id number]  Unit/Bed#: LD Triage  Encounter: [id number]    Chief Complaint: Contractions  CARLOS A: Estimated Date of Delivery: 18    HPI: Patient is a  at 44w3d here with complaints of contractions  Please see H&P  Vitals:   /81 (BP Location: Right arm)   Pulse 100   Temp 97 9 °F (36 6 °C) (Oral)   Resp 20   Ht 5' 2" (1 575 m)   Wt 77 1 kg (170 lb)   LMP 2017   BMI 31 09 kg/m²   Body mass index is 31 09 kg/m²  Physical Exam  SVE: Dilation: 4  Effacement (%): 80  Station: -2  Method: Manual  OB Examiner: EKonomidis    FHT:  Baseline Rate: 140 bpm  Variability: Moderate 6-25 bpm  Accelerations: 15 x 15 or greater, At variable times  Decelerations: None  TOCO:   Contraction Frequency (minutes): 2-3  Contraction Duration (seconds): 60-90  Contraction Quality: Moderate    Lab, Imaging and other studies: I have personally reviewed pertinent reports  A/P:   Patient will be admitted in labor as she made cervical change  Please see H&P      Surekha Malagon MD  2018  10:26 AM

## 2018-04-22 NOTE — H&P
H&P Exam - Obstetrics   Gina Martinez 29 y o  female MRN: [id number]  Unit/Bed#: -01 Encounter: [id number]      History of Present Illness     Chief Complaint: Contraction Pain    HPI:  Gina Martinez is a 29 y o   female with an CARLOS A of 2018, at 40w3d weeks gestation who is being admitted for labor  Contractions: Reports her contractions began around 3 AM and have since gotten closer together and stronger- she now feels them every 5 minutes  Vaginal Bleeding: Minimal spotting with wiping  Loss of Fluid:Denies  Fetal Movement: Reports good fetal movement    She denies any other complaints today  She is a [facility] patient  She desires natural methods for pain management a this time  PREGNANCY COMPLICATIONS:  GBS carrier  Rubella non-immune    OB History    Para Term  AB Living   1             SAB TAB Ectopic Multiple Live Births                  # Outcome Date GA Lbr Marcin/2nd Weight Sex Delivery Anes PTL Lv   1 Current                   Baby complications/comments:   No complications  EFW 7lbs by leopolds  Posterior placenta    Review of Systems   Constitutional: Negative for chills and fever  Eyes: Negative for visual disturbance  Respiratory: Negative for chest tightness, shortness of breath and wheezing  Cardiovascular: Negative for chest pain, palpitations and leg swelling  Gastrointestinal: Positive for abdominal pain  Negative for constipation, diarrhea, nausea and vomiting  Genitourinary: Negative for dysuria, flank pain, frequency, hematuria and urgency  Musculoskeletal: Negative for arthralgias and myalgias  Neurological: Negative for dizziness, weakness, light-headedness and headaches  Historical Information   No past medical history on file    Past Surgical History:   Procedure Laterality Date    NO PAST SURGERIES       Social History   History   Alcohol Use    1 2 - 1 8 oz/week    2 - 3 Cans of beer per week     Comment: prior to pregnancy     History Drug Use No     History   Smoking Status    Never Smoker   Smokeless Tobacco    Never Used     Family History:   Family History   Problem Relation Age of Onset    No Known Problems Father     No Known Problems Mother     No Known Problems Sister     No Known Problems Brother     Colon cancer Maternal Grandmother     No Known Problems Maternal Grandfather     No Known Problems Paternal Grandmother     No Known Problems Paternal Grandfather        Meds/Allergies    {  Prescriptions Prior to Admission   Medication    acetaminophen (TYLENOL) 500 mg tablet    diphenhydrAMINE (BENADRYL ALLERGY) 25 mg capsule    famotidine (PEPCID) 20 mg tablet    Prenatal Vit-Fe Fumarate-FA (PRENATAL VITAMIN PO)      No Known Allergies    OBJECTIVE:    Vitals:   /81 (BP Location: Right arm)   Pulse 100   Temp 97 9 °F (36 6 °C) (Oral)   Resp 20   Ht 5' 2" (1 575 m)   Wt 77 1 kg (170 lb)   LMP 07/12/2017   BMI 31 09 kg/m²   Body mass index is 31 09 kg/m²  Physical Exam   Constitutional: She is oriented to person, place, and time  She appears well-developed and well-nourished  No distress  HENT:   Head: Normocephalic and atraumatic  Neck: Normal range of motion  Neck supple  Cardiovascular: Normal rate, regular rhythm and normal heart sounds  Exam reveals no gallop and no friction rub  No murmur heard  Pulmonary/Chest: Effort normal and breath sounds normal  No respiratory distress  She has no wheezes  She has no rales  Abdominal: Soft  There is no tenderness  There is no rebound and no guarding  Genitourinary: Vagina normal    Neurological: She is alert and oriented to person, place, and time  Skin: Skin is warm and dry  She is not diaphoretic  Psychiatric: She has a normal mood and affect  Her behavior is normal    Vitals reviewed      SVE: Dilation: 5  Effacement (%): 80  Cervical Characteristics: Posterior  Station: -2  Presentation: Vertex  Method: Manual  OB Examiner: Rolan    FHT:  Baseline Rate: 140 bpm  Variability: Moderate 6-25 bpm  Accelerations: 15 x 15 or greater  Decelerations: None  TOCO:   Contraction Frequency (minutes): 2-3  Contraction Duration (seconds): 50-60  Contraction Quality: Moderate      Prenatal Labs:   Blood type: A positive  Antigen Screen: negative  Rubella: Immune  HIV: Negative  RPR: NR  Hep B: negative  Diabetes Screen: 144  GTT: 84/ 152/ 120/ 101  GBS: positive    Invasive Devices     Peripheral Intravenous Line            Peripheral IV 18 Left Wrist less than 1 day                  Assessment/Plan     ASSESSMENT:   at 40w3d weeks gestation being admitted in labor  GBS carrier    PLAN:   1) Admit to L&D   2) CBC, RPR, Blood Type   3) Analgesia and/or epidural at patient request   4) PCN 5 million units followed by 2 5 million units Q4 hours for GBS prophylaxis   5) Anticipate     This patient will be an INPATIENT  and I certify the anticipated length of stay is >2 Midnights        Edd Carney MD  2018  12:42 PM

## 2018-04-22 NOTE — OB LABOR/OXYTOCIN SAFETY PROGRESS
Labor Progress Note - Cliff Mitchell 29 y o  female MRN: [id number]    Unit/Bed#: -01 Encounter: [id number]    Obstetric History       T0      L0     SAB0   TAB0   Ectopic0   Multiple0   Live Births0      Gestational Age: 44w3d     Contraction Frequency (minutes): 2 5-4  Contraction Quality: Moderate  Tachysystole: No   Dilation: 6        Effacement (%): 100  Station: -2  Baseline Rate: 130 bpm  FHR Category: Category I     Notes/comments:   Patient returning from [place] with increased contraction pain now requesting epidural  Making cervical change, now 6100/-2  FHT cat I   Will continue expectant management    D/w Dr Humberto Gibbs MD 2018 3:46 PM

## 2018-04-22 NOTE — OB LABOR/OXYTOCIN SAFETY PROGRESS
Labor Progress Note - Gem Guillen 29 y o  female MRN: [id number]    Unit/Bed#:  Triage  Encounter: [id number]    Obstetric History       T0      L0     SAB0   TAB0   Ectopic0   Multiple0   Live Births0      Gestational Age: 44w3d     Contraction Frequency (minutes): 2-3  Contraction Quality: Moderate  Tachysystole: No   Dilation: 5        Effacement (%): 80  Station: -2  Baseline Rate: 140 bpm  Fetal Heart Rate: 144 BPM             Notes/comments:     Patient very uncomfortable, will move to labor room  Pain control PRN             Katelynn Gatica MD 2018 12:23 PM

## 2018-04-22 NOTE — ANESTHESIA PREPROCEDURE EVALUATION
Review of Systems/Medical History          Cardiovascular  Negative cardio ROS    Pulmonary  Negative pulmonary ROS        GI/Hepatic    GERD (with pregnancy) ,        Negative  ROS        Endo/Other  Negative endo/other ROS      GYN  Currently pregnant ,          Hematology  Negative hematology ROS      Musculoskeletal  Negative musculoskeletal ROS        Neurology  Negative neurology ROS      Psychology   Negative psychology ROS              Physical Exam    Airway    Mallampati score: III  TM Distance: >3 FB  Neck ROM: full     Dental   No notable dental hx     Cardiovascular  Comment: Negative ROS,     Pulmonary      Other Findings        Anesthesia Plan  ASA Score- 2     Anesthesia Type- epidural with ASA Monitors  Additional Monitors:   Airway Plan:     Comment: 0505: C section called for fetal intolerance to labor, epidural for c section vs  GA with ETT discussed  Pt understands and agrees  Plan Factors-    Induction-     Postoperative Plan-     Informed Consent- Anesthetic plan and risks discussed with patient  I personally reviewed this patient with the CRNA  Discussed and agreed on the Anesthesia Plan with the CRNA  Emily Elizabeth

## 2018-04-22 NOTE — OB LABOR/OXYTOCIN SAFETY PROGRESS
Labor Progress Note - Hue Bueno 29 y o  female MRN: [id number]    Unit/Bed#: -01 Encounter: [id number]    Obstetric History       T0      L0     SAB0   TAB0   Ectopic0   Multiple0   Live Births0      Gestational Age: 44w3d     Contraction Frequency (minutes): 4-5  Contraction Quality: Strong  Tachysystole: No   Dilation: 6        Effacement (%): 100  Station: -2  Baseline Rate: 140 bpm  Fetal Heart Rate: 125 BPM  FHR Category: Category I          Notes/comments:         Patient more comfortable with her epidural   Had 2 5 minute prolonged decel, spontaneously recovered  Will continue to monitor  Discussed possible AROM at next check or if continued decelerations        Heidy Worrell MD 2018 5:59 PM

## 2018-04-23 LAB
BASE EXCESS BLDCOA CALC-SCNC: -9.6 MMOL/L (ref 3–11)
BASE EXCESS BLDCOV CALC-SCNC: -9.7 MMOL/L (ref 1–9)
HCO3 BLDCOA-SCNC: 19.5 MMOL/L (ref 17.3–27.3)
HCO3 BLDCOV-SCNC: 17 MMOL/L (ref 12.2–28.6)
O2 CT VFR BLDCOA CALC: 7.8 ML/DL
OXYHGB MFR BLDCOA: 33.8 %
OXYHGB MFR BLDCOV: 54.1 %
PCO2 BLDCOA: 54.6 MM[HG] (ref 30–60)
PCO2 BLDCOV: 40 MM HG (ref 27–43)
PH BLDCOA: 7.17 [PH] (ref 7.23–7.43)
PH BLDCOV: 7.25 [PH] (ref 7.19–7.49)
PO2 BLDCOA: 20.7 MM HG (ref 5–25)
PO2 BLDCOV: 26.3 MM HG (ref 15–45)
RPR SER QL: NORMAL
SAO2 % BLDCOV: 13 ML/DL

## 2018-04-23 PROCEDURE — 4A1HXCZ MONITORING OF PRODUCTS OF CONCEPTION, CARDIAC RATE, EXTERNAL APPROACH: ICD-10-PCS | Performed by: OBSTETRICS & GYNECOLOGY

## 2018-04-23 PROCEDURE — 10907ZC DRAINAGE OF AMNIOTIC FLUID, THERAPEUTIC FROM PRODUCTS OF CONCEPTION, VIA NATURAL OR ARTIFICIAL OPENING: ICD-10-PCS | Performed by: OBSTETRICS & GYNECOLOGY

## 2018-04-23 PROCEDURE — 82805 BLOOD GASES W/O2 SATURATION: CPT | Performed by: OBSTETRICS & GYNECOLOGY

## 2018-04-23 PROCEDURE — 59510 CESAREAN DELIVERY: CPT | Performed by: OBSTETRICS & GYNECOLOGY

## 2018-04-23 RX ORDER — IBUPROFEN 600 MG/1
600 TABLET ORAL EVERY 6 HOURS PRN
Status: DISCONTINUED | OUTPATIENT
Start: 2018-04-24 | End: 2018-04-27 | Stop reason: HOSPADM

## 2018-04-23 RX ORDER — TERBUTALINE SULFATE 1 MG/ML
INJECTION, SOLUTION SUBCUTANEOUS
Status: COMPLETED
Start: 2018-04-23 | End: 2018-04-23

## 2018-04-23 RX ORDER — ONDANSETRON 2 MG/ML
4 INJECTION INTRAMUSCULAR; INTRAVENOUS EVERY 4 HOURS PRN
Status: DISCONTINUED | OUTPATIENT
Start: 2018-04-23 | End: 2018-04-23

## 2018-04-23 RX ORDER — OXYCODONE HYDROCHLORIDE AND ACETAMINOPHEN 5; 325 MG/1; MG/1
2 TABLET ORAL EVERY 4 HOURS PRN
Status: DISCONTINUED | OUTPATIENT
Start: 2018-04-24 | End: 2018-04-27 | Stop reason: HOSPADM

## 2018-04-23 RX ORDER — DIPHENHYDRAMINE HCL 25 MG
25 TABLET ORAL EVERY 6 HOURS PRN
Status: DISCONTINUED | OUTPATIENT
Start: 2018-04-23 | End: 2018-04-27 | Stop reason: HOSPADM

## 2018-04-23 RX ORDER — DOCUSATE SODIUM 100 MG/1
100 CAPSULE, LIQUID FILLED ORAL 2 TIMES DAILY
Status: DISCONTINUED | OUTPATIENT
Start: 2018-04-23 | End: 2018-04-27 | Stop reason: HOSPADM

## 2018-04-23 RX ORDER — SIMETHICONE 80 MG
80 TABLET,CHEWABLE ORAL 4 TIMES DAILY PRN
Status: DISCONTINUED | OUTPATIENT
Start: 2018-04-23 | End: 2018-04-27 | Stop reason: HOSPADM

## 2018-04-23 RX ORDER — SODIUM CHLORIDE, SODIUM LACTATE, POTASSIUM CHLORIDE, CALCIUM CHLORIDE 600; 310; 30; 20 MG/100ML; MG/100ML; MG/100ML; MG/100ML
125 INJECTION, SOLUTION INTRAVENOUS CONTINUOUS
Status: DISCONTINUED | OUTPATIENT
Start: 2018-04-23 | End: 2018-04-23

## 2018-04-23 RX ORDER — SUCCINYLCHOLINE/SOD CL,ISO/PF 100 MG/5ML
SYRINGE (ML) INTRAVENOUS AS NEEDED
Status: DISCONTINUED | OUTPATIENT
Start: 2018-04-23 | End: 2018-04-23 | Stop reason: SURG

## 2018-04-23 RX ORDER — KETOROLAC TROMETHAMINE 30 MG/ML
30 INJECTION, SOLUTION INTRAMUSCULAR; INTRAVENOUS EVERY 6 HOURS
Status: COMPLETED | OUTPATIENT
Start: 2018-04-23 | End: 2018-04-24

## 2018-04-23 RX ORDER — MORPHINE SULFATE 1 MG/ML
INJECTION, SOLUTION EPIDURAL; INTRATHECAL; INTRAVENOUS AS NEEDED
Status: DISCONTINUED | OUTPATIENT
Start: 2018-04-23 | End: 2018-04-23 | Stop reason: SURG

## 2018-04-23 RX ORDER — NALBUPHINE HCL 10 MG/ML
5 AMPUL (ML) INJECTION
Status: DISCONTINUED | OUTPATIENT
Start: 2018-04-23 | End: 2018-04-23

## 2018-04-23 RX ORDER — ACETAMINOPHEN 325 MG/1
650 TABLET ORAL EVERY 6 HOURS PRN
Status: DISCONTINUED | OUTPATIENT
Start: 2018-04-23 | End: 2018-04-27 | Stop reason: HOSPADM

## 2018-04-23 RX ORDER — OXYCODONE HYDROCHLORIDE AND ACETAMINOPHEN 5; 325 MG/1; MG/1
1 TABLET ORAL EVERY 4 HOURS PRN
Status: DISCONTINUED | OUTPATIENT
Start: 2018-04-24 | End: 2018-04-27 | Stop reason: HOSPADM

## 2018-04-23 RX ORDER — METOCLOPRAMIDE HYDROCHLORIDE 5 MG/ML
5 INJECTION INTRAMUSCULAR; INTRAVENOUS EVERY 6 HOURS PRN
Status: DISCONTINUED | OUTPATIENT
Start: 2018-04-23 | End: 2018-04-23

## 2018-04-23 RX ORDER — ONDANSETRON 2 MG/ML
4 INJECTION INTRAMUSCULAR; INTRAVENOUS EVERY 8 HOURS PRN
Status: DISCONTINUED | OUTPATIENT
Start: 2018-04-23 | End: 2018-04-27 | Stop reason: HOSPADM

## 2018-04-23 RX ORDER — SODIUM CHLORIDE, SODIUM LACTATE, POTASSIUM CHLORIDE, CALCIUM CHLORIDE 600; 310; 30; 20 MG/100ML; MG/100ML; MG/100ML; MG/100ML
INJECTION, SOLUTION INTRAVENOUS CONTINUOUS PRN
Status: DISCONTINUED | OUTPATIENT
Start: 2018-04-23 | End: 2018-04-23 | Stop reason: SURG

## 2018-04-23 RX ORDER — TERBUTALINE SULFATE 1 MG/ML
0.25 INJECTION, SOLUTION SUBCUTANEOUS ONCE
Status: COMPLETED | OUTPATIENT
Start: 2018-04-23 | End: 2018-04-23

## 2018-04-23 RX ORDER — ACETAMINOPHEN 325 MG/1
650 TABLET ORAL EVERY 6 HOURS PRN
Status: DISCONTINUED | OUTPATIENT
Start: 2018-04-23 | End: 2018-04-23

## 2018-04-23 RX ORDER — DIAPER,BRIEF,INFANT-TODD,DISP
1 EACH MISCELLANEOUS DAILY PRN
Status: DISCONTINUED | OUTPATIENT
Start: 2018-04-23 | End: 2018-04-27 | Stop reason: HOSPADM

## 2018-04-23 RX ORDER — CALCIUM CARBONATE 200(500)MG
1000 TABLET,CHEWABLE ORAL DAILY PRN
Status: DISCONTINUED | OUTPATIENT
Start: 2018-04-23 | End: 2018-04-27 | Stop reason: HOSPADM

## 2018-04-23 RX ORDER — DEXAMETHASONE SODIUM PHOSPHATE 4 MG/ML
INJECTION, SOLUTION INTRA-ARTICULAR; INTRALESIONAL; INTRAMUSCULAR; INTRAVENOUS; SOFT TISSUE AS NEEDED
Status: DISCONTINUED | OUTPATIENT
Start: 2018-04-23 | End: 2018-04-23 | Stop reason: SURG

## 2018-04-23 RX ORDER — PROPOFOL 10 MG/ML
INJECTION, EMULSION INTRAVENOUS AS NEEDED
Status: DISCONTINUED | OUTPATIENT
Start: 2018-04-23 | End: 2018-04-23 | Stop reason: SURG

## 2018-04-23 RX ORDER — OXYTOCIN/RINGER'S LACTATE 30/500 ML
62.5 PLASTIC BAG, INJECTION (ML) INTRAVENOUS CONTINUOUS
Status: ACTIVE | OUTPATIENT
Start: 2018-04-23 | End: 2018-04-23

## 2018-04-23 RX ORDER — FENTANYL CITRATE 50 UG/ML
INJECTION, SOLUTION INTRAMUSCULAR; INTRAVENOUS AS NEEDED
Status: DISCONTINUED | OUTPATIENT
Start: 2018-04-23 | End: 2018-04-23 | Stop reason: SURG

## 2018-04-23 RX ORDER — ONDANSETRON 2 MG/ML
INJECTION INTRAMUSCULAR; INTRAVENOUS AS NEEDED
Status: DISCONTINUED | OUTPATIENT
Start: 2018-04-23 | End: 2018-04-23 | Stop reason: SURG

## 2018-04-23 RX ORDER — FENTANYL CITRATE/PF 50 MCG/ML
25 SYRINGE (ML) INJECTION
Status: DISCONTINUED | OUTPATIENT
Start: 2018-04-23 | End: 2018-04-27 | Stop reason: HOSPADM

## 2018-04-23 RX ORDER — KETOROLAC TROMETHAMINE 30 MG/ML
INJECTION, SOLUTION INTRAMUSCULAR; INTRAVENOUS AS NEEDED
Status: DISCONTINUED | OUTPATIENT
Start: 2018-04-23 | End: 2018-04-23 | Stop reason: SURG

## 2018-04-23 RX ADMIN — DOCUSATE SODIUM 100 MG: 100 CAPSULE, LIQUID FILLED ORAL at 18:39

## 2018-04-23 RX ADMIN — TERBUTALINE SULFATE 0.25 MG: 1 INJECTION, SOLUTION SUBCUTANEOUS at 04:56

## 2018-04-23 RX ADMIN — DEXAMETHASONE SODIUM PHOSPHATE 8 MG: 4 INJECTION, SOLUTION INTRAMUSCULAR; INTRAVENOUS at 05:20

## 2018-04-23 RX ADMIN — SODIUM CHLORIDE, SODIUM LACTATE, POTASSIUM CHLORIDE, AND CALCIUM CHLORIDE 999 ML/HR: .6; .31; .03; .02 INJECTION, SOLUTION INTRAVENOUS at 00:02

## 2018-04-23 RX ADMIN — FENTANYL CITRATE 25 MCG: 50 INJECTION, SOLUTION INTRAMUSCULAR; INTRAVENOUS at 07:59

## 2018-04-23 RX ADMIN — PROPOFOL 200 MG: 10 INJECTION, EMULSION INTRAVENOUS at 05:30

## 2018-04-23 RX ADMIN — KETOROLAC TROMETHAMINE 30 MG: 30 INJECTION, SOLUTION INTRAMUSCULAR at 23:42

## 2018-04-23 RX ADMIN — FENTANYL CITRATE 25 MCG: 50 INJECTION, SOLUTION INTRAMUSCULAR; INTRAVENOUS at 07:03

## 2018-04-23 RX ADMIN — SODIUM CHLORIDE, SODIUM LACTATE, POTASSIUM CHLORIDE, AND CALCIUM CHLORIDE: .6; .31; .03; .02 INJECTION, SOLUTION INTRAVENOUS at 05:00

## 2018-04-23 RX ADMIN — FENTANYL CITRATE 25 MCG: 50 INJECTION, SOLUTION INTRAMUSCULAR; INTRAVENOUS at 05:41

## 2018-04-23 RX ADMIN — OXYTOCIN 10 UNITS: 10 INJECTION, SOLUTION INTRAMUSCULAR; INTRAVENOUS at 05:40

## 2018-04-23 RX ADMIN — KETOROLAC TROMETHAMINE 30 MG: 30 INJECTION, SOLUTION INTRAMUSCULAR at 18:39

## 2018-04-23 RX ADMIN — SODIUM CHLORIDE 2.5 MILLION UNITS: 9 INJECTION, SOLUTION INTRAVENOUS at 02:34

## 2018-04-23 RX ADMIN — OXYTOCIN 20 UNITS: 10 INJECTION, SOLUTION INTRAMUSCULAR; INTRAVENOUS at 05:34

## 2018-04-23 RX ADMIN — SODIUM CHLORIDE, SODIUM LACTATE, POTASSIUM CHLORIDE, AND CALCIUM CHLORIDE 125 ML/HR: .6; .31; .03; .02 INJECTION, SOLUTION INTRAVENOUS at 12:10

## 2018-04-23 RX ADMIN — FENTANYL CITRATE 25 MCG: 50 INJECTION, SOLUTION INTRAMUSCULAR; INTRAVENOUS at 07:14

## 2018-04-23 RX ADMIN — SODIUM CHLORIDE, SODIUM LACTATE, POTASSIUM CHLORIDE, AND CALCIUM CHLORIDE: .6; .31; .03; .02 INJECTION, SOLUTION INTRAVENOUS at 06:15

## 2018-04-23 RX ADMIN — AZITHROMYCIN 500 MG: 500 INJECTION, POWDER, LYOPHILIZED, FOR SOLUTION INTRAVENOUS at 05:27

## 2018-04-23 RX ADMIN — MORPHINE SULFATE 4 MG: 1 INJECTION, SOLUTION EPIDURAL; INTRATHECAL; INTRAVENOUS at 05:43

## 2018-04-23 RX ADMIN — FENTANYL CITRATE 50 MCG: 50 INJECTION, SOLUTION INTRAMUSCULAR; INTRAVENOUS at 06:00

## 2018-04-23 RX ADMIN — Medication 62.5 MILLI-UNITS/MIN: at 12:02

## 2018-04-23 RX ADMIN — MORPHINE SULFATE 1 MG: 1 INJECTION, SOLUTION EPIDURAL; INTRATHECAL; INTRAVENOUS at 05:45

## 2018-04-23 RX ADMIN — ONDANSETRON 4 MG: 2 INJECTION INTRAMUSCULAR; INTRAVENOUS at 05:34

## 2018-04-23 RX ADMIN — OXYTOCIN 30 UNITS: 10 INJECTION, SOLUTION INTRAMUSCULAR; INTRAVENOUS at 06:15

## 2018-04-23 RX ADMIN — SODIUM CHLORIDE, SODIUM LACTATE, POTASSIUM CHLORIDE, AND CALCIUM CHLORIDE: .6; .31; .03; .02 INJECTION, SOLUTION INTRAVENOUS at 05:34

## 2018-04-23 RX ADMIN — Medication 100 MG: at 05:30

## 2018-04-23 RX ADMIN — FENTANYL CITRATE 25 MCG: 50 INJECTION, SOLUTION INTRAMUSCULAR; INTRAVENOUS at 05:50

## 2018-04-23 RX ADMIN — SODIUM CHLORIDE, SODIUM LACTATE, POTASSIUM CHLORIDE, AND CALCIUM CHLORIDE 125 ML/HR: .6; .31; .03; .02 INJECTION, SOLUTION INTRAVENOUS at 03:06

## 2018-04-23 RX ADMIN — KETOROLAC TROMETHAMINE 30 MG: 30 INJECTION, SOLUTION INTRAMUSCULAR at 12:24

## 2018-04-23 RX ADMIN — KETOROLAC TROMETHAMINE 30 MG: 30 INJECTION, SOLUTION INTRAMUSCULAR at 05:55

## 2018-04-23 NOTE — OB LABOR/OXYTOCIN SAFETY PROGRESS
Labor Progress Note - Jamie Paulinoolph 29 y o  female MRN: [id number]    Unit/Bed#: -01 Encounter: [id number]    Obstetric History       T0      L0     SAB0   TAB0   Ectopic0   Multiple0   Live Births0      Gestational Age: 40w3d  Dose (gail-units/min) Oxytocin: 0 gail-units/min  Contraction Frequency (minutes): 2  Contraction Quality: Moderate  Tachysystole: No   Dilation: Lip/rim (Comment)        Effacement (%): 100  Station: -1  Baseline Rate: 120 bpm  Fetal Heart Rate: 151 BPM  FHR Category: Category II          Notes/comments:     Patient having intermittent variable decelerations and early decelerations since AROM  A few were prolonged about 2 minutes in length with spontaneous return to baseline  Patient is feeling more pressure and has made rapid cervical change from 8 to 9 5 in 20 minutes  Will discontinue pitocin as contractions are now every 1-2 minutes  Vertex is now LOP               Bianca House MD 2018 11:48 PM

## 2018-04-23 NOTE — LACTATION NOTE
CONSULT - LACTATION  Elizabeth Steinberg 29 y o  female MRN: [id number]    [facility] L&D Room / Bed:  320/ 320-01 Encounter: [id number]    Previouse breast reduction surgery? no    Lactation history:   Has patient previously breast fed: No   How long had patient previously breast fed:     Previous breast feeding complications:       Assessment     Breast and nipple assessment: normal assessment     Assessment: RDS in NICU    Feeding recommendations:  pump every 2-3 hours    Met with mother  Provided mother with Ready, Set, Baby booklet  Discussed Skin to Skin contact an benefits to mom and baby  Talked about benefits of exclusive BM for the first 6 months  Instructions given on pumping  Discussed when to start, frequency, different pumps available versus manual expression  Discussed hygiene of hands and supplies as well as assembly, placement of flanges, size of flanged, preparing the breast and cycles and suction settings on pump  Demonstrated use of hand pump  Discussed labeling of milk, storage, and preparation of stored milk  Call for assistance with pumping as needed         Alyssa Cruz RN 2018 5:13 PM

## 2018-04-23 NOTE — ADDENDUM NOTE
Addendum  created 04/23/18 1921 by Britni [facility] MD    Anesthesia Attestations filed, Anesthesia Event deleted, Anesthesia Event edited, Anesthesia Staff edited

## 2018-04-23 NOTE — ANESTHESIA POSTPROCEDURE EVALUATION
Post-Op Assessment Note      CV Status:  Stable    Mental Status:  Alert and awake    Hydration Status:  Stable    PONV Controlled:  None    Airway Patency:  Patent    Post Op Vitals Reviewed: Yes          Staff: Anesthesiologist     Post-op block assessment: adhesive bandage applied, catheter intact, site cleaned and no complications        BP      Temp      Pulse     Resp      SpO2

## 2018-04-23 NOTE — OB LABOR/OXYTOCIN SAFETY PROGRESS
Labor Progress Note - Zora Sportsman 29 y o  female MRN: [id number]    Unit/Bed#: -01 Encounter: [id number]    Obstetric History       T0      L0     SAB0   TAB0   Ectopic0   Multiple0   Live Births0      Gestational Age: 44w3d     Contraction Frequency (minutes): 5  Contraction Quality: Not applicable  Tachysystole: No   Dilation: 6        Effacement (%): 100  Station: -2  Baseline Rate: 125 bpm  Fetal Heart Rate: 133 BPM  FHR Category: Category I          Notes/comments:     Patient cervical exam is still unchanged at 6cm  Patient counseled about her options for augmentation including AROM and pitocin  Per discussion with Dr Tamara Burgess, will first give pitocin as fetal vertex is still high at -2 station  Vertex slightly asynclitic, but ANYI position, odes not feel to be large in size or diameter  Patient and  and  all agreeable to pitocin    FHT category 1, having early decelerations            Yanet Mojica MD 2018 8:32 PM

## 2018-04-23 NOTE — DISCHARGE INSTRUCTIONS
WHAT YOU NEED TO KNOW:   A , or  section, is abdominal surgery to deliver your baby  DISCHARGE INSTRUCTIONS:   Call 911 for any of the following:   · You feel lightheaded, short of breath, and have chest pain  · You cough up blood  Seek care immediately if:   · Blood soaks through your bandage  · Your stitches come apart  · Your arm or leg feels warm, tender, and painful  It may look swollen and red  Contact your OB if:   · You have heavy vaginal bleeding that fills 1 or more sanitary pads in 1 hour  · You have a fever  · Your incision is swollen, red, or draining pus  · You have questions or concerns about yourself or your baby  Medicines: You may  need any of the following:  · Prescription pain medicine  may be given  Ask how to take this medicine safely  · Acetaminophen  decreases pain and fever  It is available without a doctor's order  Ask how much to take and how often to take it  Follow directions  Acetaminophen can cause liver damage if not taken correctly  · NSAIDs , such as ibuprofen, help decrease swelling, pain, and fever  NSAIDs can cause stomach bleeding or kidney problems in certain people  If you take blood thinner medicine, always ask your healthcare provider if NSAIDs are safe for you  Always read the medicine label and follow directions  · Take your medicine as directed  Contact your healthcare provider if you think your medicine is not helping or if you have side effects  Tell him or her if you are allergic to any medicine  Keep a list of the medicines, vitamins, and herbs you take  Include the amounts, and when and why you take them  Bring the list or the pill bottles to follow-up visits  Carry your medicine list with you in case of an emergency  Wound care:  Carefully wash your wound with soap and water every day  Keep your wound clean and dry  Wear loose, comfortable clothes that do not rub against your wound   Ask your OB about bathing and showering  Limit activity as directed:   · Ask when it is safe for you to drive, walk up stairs, lift heavy objects, and have sex  · Ask when it is okay to exercise, and what types of exercise to do  Start slowly and do more as you get stronger  Drink liquids as directed:  Liquids help keep you hydrated after your procedure and decrease your risk for a blood clot  Ask how much liquid to drink each day and which liquids are best for you  Follow up with your OB as directed: You may need to return to have your stitches or staples removed  Write down your questions so you remember to ask them during your visits  © 2017 [employer]  Information is for End User's use only and may not be sold, redistributed or otherwise used for commercial purposes  All illustrations and images included in CareNotes® are the copyrighted property of [employer] M [employer]  or [employer]  The above information is an  only  It is not intended as medical advice for individual conditions or treatments  Talk to your doctor, nurse or pharmacist before following any medical regimen to see if it is safe and effective for you  Postpartum Bleeding   WHAT YOU NEED TO KNOW:   Postpartum bleeding is vaginal bleeding after childbirth  This bleeding is normal, whether your baby was born vaginally or by   It contains blood and the tissue that lined the inside of your uterus when you were pregnant  DISCHARGE INSTRUCTIONS:   What to expect with postpartum bleeding:  Postpartum bleeding usually lasts at least 10 days, and may last longer than 6 weeks  Your bleeding may range from light (barely staining a pad) to heavy (soaking a pad in 1 hour)  Usually, you have heavier bleeding right after childbirth, which slows over the next few weeks until it stops  The bleeding is red or dark brown with clots for the first 1 to 3 days   It then turns pink for several days, and then becomes a white or yellow discharge until it ends  Follow up with your obstetrician as directed:  Do not have sex until your obstetrician says it is okay  Write down your questions so you remember to ask them during your visits  Contact your healthcare provider or obstetrician if:   · Your bleeding increases, or you have heavy bleeding that soaks a pad in 1 hour for 2 hours in a row  · You pass large blood clots  · You are breathing faster than normal, or your heart is beating faster than normal     · You are urinating less than usual, or not at all  · You feel dizzy  · You have questions or concerns about your condition or care  Seek immediate care or call 911 if:   · You are suddenly short of breath and feel lightheaded  · You have sudden chest pain  © 2017 [employer]  Information is for End User's use only and may not be sold, redistributed or otherwise used for commercial purposes  All illustrations and images included in CareNotes® are the copyrighted property of A [employer] M [employer] Inc  or [address]  The above information is an  only  It is not intended as medical advice for individual conditions or treatments  Talk to your doctor, nurse or pharmacist before following any medical regimen to see if it is safe and effective for you  Postpartum Depression   WHAT YOU NEED TO KNOW:   What is postpartum depression? Postpartum depression is a mood disorder that occurs after giving birth  A mood is an emotion or a feeling  Moods affect your behavior and how you feel about yourself and life in general  Depression is a sad mood that you cannot control  Women often feel sad, afraid, or nervous after their baby is born  These feelings are called postpartum blues or baby blues, and they usually go away in 1 to 2 weeks  With postpartum depression, these symptoms get worse and continue for more than 2 weeks   Postpartum depression is a serious condition that affects your daily activities and relationships  What causes postpartum depression? Healthcare providers do not know exactly what causes postpartum depression  It may be caused by a sudden drop in hormone levels after childbirth  A previous episode of postpartum depression or a family history of depression may increase your risk  Several things may trigger postpartum depression:  · Lack of support from the baby's father or other family members    · Feeling more tired than usual    · Stress, a poor diet, or lack of sleep    · Pain after childbirth or pain during breastfeeding    · Sudden change in lifestyle  How is postpartum depression diagnosed? Postpartum depression affects your daily activities and your relationships with other people  Healthcare providers will ask you questions about your signs and symptoms and how they are affecting your life  The symptoms of postpartum depression usually begin within 1 month after childbirth  You feel depressed or lose interest in activities you enjoy nearly every day for at least 2 weeks  You also have 4 or more of the following symptoms:  · You feel tired or have less energy than usual      · You feel unimportant or guilty most of the time  · You think about hurting or killing yourself  · Your appetite changes  You may lose your appetite and lose weight without trying  Your appetite may also increase and you may gain weight  · You are restless, irritable, or withdrawn  · You have trouble concentrating and remembering things  You have trouble doing daily tasks or making decisions  · You have trouble sleeping, even after the baby is asleep  How is postpartum depression treated? · Psychotherapy:  During therapy, you will talk with healthcare providers about how to cope with your feelings and moods  This can be done alone or in a group  It may also be done with family members or your partner  · Antidepressants:   This medicine is given to decrease or stop the symptoms of depression  You usually need to take antidepressants for several weeks before you begin to feel better  Do not stop taking antidepressants unless your healthcare provider tells you to  Healthcare providers may try a different antidepressant if one type does not work  What can I do to feel better? · Rest:  Do not try to do everything all at the same time  Do only what is needed and let other things wait until later  Ask your family or friends for help, especially if you have other children  Ask your partner to help with night feedings or other baby care  Try to sleep when the baby naps  · Get emotional support:  Share your feelings with your partner, a friend, or another mother  · Take care of yourself:  Shower and dress each day  Do not skip meals  Try to get out of the house a little each day  Get regular exercise  Eat a healthy diet  Avoid alcohol because it can make your depression worse  Do not isolate yourself  Go for a walk or meet with a friend  It is also important that you have some time by yourself each day  How do I find support and more information? · [address] [place] [address], Public Information & Communication Branch  [address], [address], [address]  [place] [address]   Phone: [phone]  Phone: [phone]  Web Address: [facility]  When should I contact my healthcare provider? · You cannot make it to your next visit  · Your depression does not get better with treatment or it gets worse  · You have questions or concerns about your condition or care  When should I seek immediate care or call 911? · You think about hurting or killing yourself, your baby, or someone else  · You feel like other people want to hurt you  · You hear voices telling you to hurt yourself or your baby  CARE AGREEMENT:   You have the right to help plan your care   Learn about your health condition and how it may be treated  Discuss treatment options with your caregivers to decide what care you want to receive  You always have the right to refuse treatment  The above information is an  only  It is not intended as medical advice for individual conditions or treatments  Talk to your doctor, nurse or pharmacist before following any medical regimen to see if it is safe and effective for you  © 2017 [employer] Information is for End User's use only and may not be sold, redistributed or otherwise used for commercial purposes  All illustrations and images included in CareNotes® are the copyrighted property of [employer]  or [address]  Breast Care for the Breast Feeding Mother   WHAT YOU SHOULD KNOW:   Your breasts will go through normal changes while you are breastfeeding  Sometimes breast and nipple problems can develop while you are breastfeeding  Learn about changes that are normal and those that may be a problem  Breast care can help you prevent and manage problems so you and your baby can enjoy the benefits of breastfeeding  AFTER YOU LEAVE:   Breast changes while you are breastfeeding:   · For the first few days after your baby is born, your body makes a small amount of breast milk (colostrum)  Within about 2 to 5 days, your body will begin making mature milk  It may take up to 10 days or longer for mature milk to come in  When your mature milk comes in, your breasts will become full and firm  They may feel tender  · Breastfeeding your baby will decrease the full feeling in your breasts  You may feel a tingly sensation during feedings as milk is released from your breasts  This is called the milk let-down reflex  After 7 or more days, the fullness may feel like it has decreased  Your nipples should look the same as they did before you started breastfeeding  Breasts that feel full before and empty after breastfeeding are signs that breastfeeding is going well    Breast problems that can occur while you are breastfeeding:   · Nipple soreness  may occur when you begin to breastfeed your baby  You may also have nipple soreness if your baby is not latched on to your breast correctly  Correct positioning and latch-on may decrease or stop the pain in your nipples  Work with your caregivers to help your baby latch on correctly  It may also be helpful to place warm, wet compresses on your nipples to help decrease pain  · Plugged milk ducts  may cause painful breast lumps  Plugged ducts may be caused by not emptying your breasts completely during feedings  When your baby pauses during breastfeeding, massage and gently squeeze your breast  Gentle massage may unplug a blocked milk duct  Pump out any milk left in your breasts after your baby is done breastfeeding  Avoid wearing tight tops, tight bras, or under-wire bras, because they may put pressure on your breasts  · Engorgement  may occur as your milk comes in soon after you begin breastfeeding  Engorgement may cause your breasts to become swollen and painful  Your breasts may also become engorged if you miss a feeding or you do not breastfeed on demand  The best way to decrease engorgement symptoms is to empty your breasts by feeding your baby often  Engorgement can make it hard for your baby to latch on to your breast  If this happens, express a small amount of milk and then have your baby latch on  Cold compresses, gel packs, or ice packs on your breasts can help decrease pain and swelling  Ask your caregiver how often and how long you should use cold, or ice packs  · A breast infection called mastitis  can develop if you have plugged milk ducts or engorgement  Mastitis causes your breasts to become red, swollen, and painful  You may also have flu-like symptoms, such as chills and a fever  Place heat on your breasts to help decrease the pain   You may want to place a moist, warm cloth on the painful breast or both of your breasts  Ask how often to do this  Your primary healthcare provider [facility]) may suggest that you take an NSAID, such as ibuprofen, to decrease pain and swelling  He may also order antibiotics to treat mastitis  Ask about feeding your baby when you have a breast infection  How to help prevent or manage breast problems while you are breastfeeding:   · Learn how to position your baby and latch him on correctly  To latch your baby correctly to your breast, make sure that his mouth covers most of your areola (dark area around your nipple)  He should not be attached only to the nipple  Your baby is latched on well if you feel comfortable and do not feel pain  A correct latch helps him get enough milk and can help to prevent sore nipples and other breast problems  There are several breastfeeding positions that you can try  Find the position that works best for you and your baby  Ask your caregiver for more information about how to hold and breastfeed your baby  · Prevent biting  Your baby may get teeth at about 1to 3months of age  To help prevent biting, break his suction once he is finished or if he has fallen asleep  To break his suction, slip a finger into the side of his mouth  If your baby bites you, respond with surprise or unhappiness  Offer praise when he does not bite you  · Breastfeed your baby regularly  Feed your baby 8 to 12 times a day  You may need to wake up your baby at night to feed him  It is okay to feed from 1 or both breasts at each feeding  Your baby should breastfeed from both breasts equally over the course of a day  If your baby only feeds from 1 side during a feeding, offer your other breast to him first for the next feeding  · Schedule and keep follow-up visits  Talk to your baby's pediatrician or your PHP during follow-up visits if you have breast problems  Caregivers may suggest that you, or you and your partner, attend classes on breastfeeding   You also may want to join a breastfeeding support group  Caregivers may suggest that you see a lactation consultant  This is a caregiver who can help you with breastfeeding  Contact your PHP if:   · You have a fever and chills  · You have body aches and you feel like you do not have any energy  · One or both of your breasts is red, swollen or hard, painful, and feels warm or hot  · You have breast engorgement that does not get better within 24 hours  · You see or feel a lump in your breast that hurts when you touch it  · You have nipple pain during breastfeeding or between feedings  · Your nipples are red, dry, cracked, or bleeding, or they have scabs on them  · You have questions or concerns about your condition or care  © 2014 [address] is for End User's use only and may not be sold, redistributed or otherwise used for commercial purposes  All illustrations and images included in CareNotes® are the copyrighted property of [employer] M [employer]  or [employer]  The above information is an  only  It is not intended as medical advice for individual conditions or treatments  Talk to your doctor, nurse or pharmacist before following any medical regimen to see if it is safe and effective for you

## 2018-04-23 NOTE — OP NOTE
OPERATIVE REPORT  PATIENT NAME: Stacey Prescott    :  1985  MRN: [id number]  Pt Location: [facility] L&D OR ROOM 02    SURGERY DATE: 2018    Surgeon(s) and Role:     * Ravi Pulido MD - Primary     * Josiah Ventura MD - Assisting    Preop Diagnosis:  Non-reassuring fetal heart rate, delivered, current hospitalization [O76]    Post-Op Diagnosis Codes:     * Non-reassuring fetal heart rate, delivered, current hospitalization [O76]    Procedure(s) (LRB):   SECTION () (N/A)    Specimen(s):  ID Type Source Tests Collected by Time Destination   A :  Cord Blood Cord BLOOD GAS, VENOUS, CORD, BLOOD GAS, ARTERIAL, CORD Ravi Pulido MD 2018 0534    B :  Tissue (Placenta on Hold) OB Only Placenta PLACENTA IN STORAGE Ravi Pulido MD 2018 0536        Estimated Blood Loss:   800cc    Drains:  Urethral Catheter Non-latex (Active)   Site Assessment Clean;Skin intact 2018  6:30 AM   Collection Container Standard drainage bag 2018  6:30 AM   Securement Method Securing device (Describe) 2018  6:30 AM   Output (mL) 550 mL 2018  4:00 AM   Number of days: 1       Anesthesia Type:   Epidural converted to general    Operative Indications:  Non-reassuring fetal heart rate, delivered, current hospitalization [O76]      Operative Findings:  Grossly normal uterus, tubes, and ovaries bilaterally  Small calcified posterior subserosal fibroid  Viable male , Apgars 1, 7 - from LOP position  Arterial pH 7 170, BE -9 6  Venous pH 7 246, BE -9 7    Complications:   None    Procedure and Technique:    Decision was made to proceed with  section due to fetal intolerance of labor  Patient was made aware of these findings and the proposed plan  Risks, benefits, possible complications, alternate treatment options, and expected outcomes were discussed with the patient  The patient agreed with the proposed plan and gave informed consent        The patient was taken to the operating room where she was properly identified to the OR staff and attending physician  She had already received epidural anesthesia during her labor course, which was augmented appropriately by Dr Aretha Abbott preoperatively  Fetal heart tones were appreciated and found to be appropriate  A Capone catheter and SCDs were in place  The vagina was prepped with Betadine  The fetal vertex was pushed up from the vagina and a red rubber catheter was inserted alongside the fetal vertex to assist in breaking the suction  The abdomen was prepped with Chloraprep and following appropriate drying time, the patient was draped in the usual sterile manner for a Pfannenstiel incision  The patient had received Ancef 1g IV and 500mg Azithromycin IV pre-operatively for prophylaxis  A Time Out was held and the above information confirmed  The patient was identified as Rema Mao and the procedure verified as  Delivery  A Pfannenstiel incision was made and carried down through the underlying subcutaneous tissue to the fascia using a scalpel  Rectus fascia was then incised  We then proceeded in Gio-Rapp fashion  All anatomic layers were well-demarcated  The rectus muscles were  and the peritoneum was identified, entered, and extended longitudinally with blunt dissection  However, at this point, the patient was feeling pain with the surgery, so the decision was made to convert to general anesthesia  Once the patient was intubated, the procedure continued and the bladder blade was inserted  A low transverse uterine incision was made with the scalpel and extended laterally with blunt dissection  The amnion was entered bluntly  Surgeons hand was inserted through the hysterotomy and the fetal head was palpated, elevated, and delivered through the uterine incision with the assistance of fundal pressure   Given the low station of the fetal vertex, a non sterile gloved hand was inserted through the vagina for assistance, but before any contact was made, the fetal vertex was successfully elevated by the surgeons hand  Baby did not have spontaneous cry, had poor color and poor tone  The umbilical cord was clamped and cut  The infant was handed off to the  providers  Arterial and venous cord gases, cord blood, and a segment of umbilical cord were obtained for evaluation and promptly sent to the lab  The placenta was manually extracted with uterine fundal massage and was noted to have a centrally inserted 3 vessel cord and was meconium stained This was sent to pathology after discussion with the patient - who originally planned to take this home  The uterus was exteriorized and a moist lap sponge was used to clear the cavity of clots and products of conception  The uterine incision was closed with a running locked suture of 0 Monocryl  There were no extensions  A second layer of the same suture was used to imbricate the first   Good hemostasis was confirmed upon uterine closure  Warmed normal saline solution was used to irrigate the posterior culdesac and the uterus was returned to the abdomen  The paracolic gutters were inspected and cleared of all clots and debris with irrigation and moist lap sponges  The fascia was closed with a running suture of 0 Vicryl  Subcutaneous tissues were closed with 2-0 plain suture  The skin was closed with 4-0 Vicryl in a subcuticular fashion  Sterile dressing was applied  At the conclusion of the procedure, all needle, sponge, and instrument counts were noted to be correct x2  The patient tolerated the procedure well and was transferred to her the recovery room in stable condition  Dr Tamara Burgess was present and participated in all key portions of the case        Patient Disposition:  PACU , hemodynamically stable and extubated and stable    SIGNATURE: Yanet Mojica MD  DATE: 2018  TIME: 6:52 AM

## 2018-04-23 NOTE — OB LABOR/OXYTOCIN SAFETY PROGRESS
Oxytocin Safety Progress Check Note - Rubia Luo 29 y o  female MRN: [id number]    Unit/Bed#: -01 Encounter: [id number]    Obstetric History       T0      L0     SAB0   TAB0   Ectopic0   Multiple0   Live Births0      Gestational Age: 40w3d  Dose (gail-units/min) Oxytocin: 6 gail-units/min  Contraction Frequency (minutes): 3  Contraction Quality: Moderate  Tachysystole: No   Dilation: 8        Effacement (%): 100  Station: -1  Baseline Rate: 120 bpm  Fetal Heart Rate: 132 BPM  FHR Category: Category II          Notes/comments:     Patient feeling intermittent pressure  AROMed for meconium fluid as membranes were bulging past cervix  After AROM, patient appreciated to be 8cm and -1 station  FHT currently category 1, however, over the past 2 hours there have been intermittent shallow variable decelerations in the setting of mostly early decelerations    Will reassess in 2 hours or sooner            Juan Ramon Villarreal MD 2018 11:18 PM

## 2018-04-23 NOTE — DISCHARGE SUMMARY
Discharge Summary - OB/GYN   Adilia Mayberry 29 y o  female MRN: [id number]  Unit/Bed#: LD OR  Encounter: [id number]      Admission Date: 2018     Discharge Date: 2018    Admitting Diagnosis:   1  Pregnancy at 40w4d  2  GBS positive  3  Rubella nonimmune    Discharge Diagnosis:   Same, delivered    Procedures: primary  section, low transverse incision    Attending: Mario Ortiz MD  Attending at discharge: Dr Molly Nair Course:     Adilia Mayberry is a 29 y o  now  who was admitted at 40 weeks and 4 days for early labor  She was initially managed expectantly but later required augmentation with Pitocin  She received an epidural for anesthesia and was artificially ruptured for meconium-stained fluid  She progressed to complete and began to push  She was noted to have a persistent category 2 strip with recurrent late decelerations  The decision was made to proceed with a primary  section due to nonreassuring fetal heart tones  She delivered a viable male  on 18 at 5  Weight 7lbs 15oz via primary  section, low transverse incision  Apgars were 1 (1 min) and 7 (5 min)   was transferred to NICU for meconium aspiration syndrome  Patient tolerated the procedure well and was transferred to recovery in stable condition  Her post-operative course was uncomplicated  Preoperative hemaglobin was 13 0, postoperative was 11 6  Her postoperative pain was well controlled with oral analgesics  She received MMR vaccine for Rubella non-immune  On day of discharge, she was ambulating and able to reasonably perform all ADLs  She was voiding and had appropriate bowel function  Pain was well controlled  She was discharged home on post-operative day #4 without complications   Patient was instructed to follow up with her OB as an outpatient and was given appropriate warnings to call provider if she develops signs of infection or uncontrolled pain     Complications: none apparent    Condition at discharge: good     Discharge instructions/Information to patient and family:   See after visit summary for information provided to patient and family  Provisions for Follow-Up Care:  See after visit summary for information related to follow-up care and any pertinent home health orders  Disposition: Home    Planned Readmission: No    Discharge Medications: For a complete list of the patient's medications, please refer to her med rec        Gustavo Villaseñor MD  OBGYN, PGY-1  4/27/2018 6:39 AM

## 2018-04-23 NOTE — PROGRESS NOTES
Met with patient,  and   Discussed option of breaking her water at this point  She would prefer to wait until she has been on the pitocin longer before we check her again  We discussed risks of AROM, potential further interventions  Her baby is cat 1 currently, with contractions q3-5 minutes  Will recheck her when she has been on pitocin ~ 2 hours  If no cervical change will revisit this topic  If continued change can hold off on AROM  Also discussed the unpredictable nature of labor, fetal tolerance to things, potential need for   All patient questions answered

## 2018-04-23 NOTE — PROGRESS NOTES
Post Op Check  Joanne Rush 29 y o  female MRN: [id number]  Unit/Bed#: -01 Encounter: [id number]    Subjective:  Pt s/p 1LTCS for NRFHT  Pt has no complaints  States pain is well controlled  She is pumping as baby is in NICU  Capone catheter in place with IVFs going  Denies passing flatus  Has not been OOB  Denies chest pain, SOB  /58   Pulse 93   Temp 98 8 °F (37 1 °C) (Oral)   Resp 18   Ht 5' 2" (1 575 m)   Wt 77 1 kg (170 lb)   LMP 07/12/2017   SpO2 98%   Breastfeeding? Yes   BMI 31 09 kg/m²     I/O this shift:  In: -   Out: 300 [Urine:300]    Lab Results   Component Value Date    WBC 13 13 (H) 04/22/2018    HGB 13 0 04/22/2018    HCT 38 5 04/22/2018    MCV 96 04/22/2018     04/22/2018       No results found for: GLUCOSE, CALCIUM, NA, K, CO2, CL, BUN, CREATININE    Physical Exam  Gen:Layin in bed, pumping   NAD, AA&O x3  CVS:RRR, no m/r/g  Lungs:CTA b/l  Abdomen:Soft, non-tender, non-distended  Incision: c/d/i with dressing in place  Uterine fundus firm and appropriately tender at umbilicus  Extremities:SCDs in place    A/P:POD #0 s/p 1LTCS for NRFHT at 40w4d  GBS carrier  Rubella Non-immune  Baby in NICU  1)Continue routine post-op care  Capone catheter in place, if making adequate urine output, can d/c at 1730 tonight and f/u voiding trial  Pre-op hgb 13 0--> f/u AM CBC  Diet: advance as tolerated  DVT ppx: SCDs in place  Encouraged incentive spirometry to reduce atelectasis and pneumonia risk  Encouraged ambulation as tolerated  2) Baby in NICU for Meconium aspiration syndrome    Danny Graham MD  OBGYN, PGY-1  4/23/2018 10:47 AM

## 2018-04-23 NOTE — PROGRESS NOTES
Patient examined after noted after having consecutive late decelerations  Patient of note having report of feeling increased pressure  Exam was +2 station at most, LILLIAN asynclitic  Patient was positioned left lateral, IVF bolus, O2 was administered as well  Proceeded with pushing  With one push, fetal tones had prolonged deceleration down to 80'sbpm with slow return to baseline  Of note, the contraction at that time was prolonged as well lasting up to 3 minutes duration  With resuscitative efforts, fetal tones returned to baseline, maintaining moderate variability as well  FSE was placed  Dr Camilla Fairbanks present at this time  Over the next several contractions, fetal tones were monitored, patient advised not to push  Patient counseled on possible need for  section and that an operative delivery is not currently an option given station  During this time, patient proceeded to have another spontaneous prolonged deceleration with a tetanic contraction of 7minute duration  Resuscitative efforts continued with additional an administration of terbutaline 0 25mg subQ x1 dose  Patient was counseled on primary  section for NRFHT by Dr Camilla Fairbanks   Patient agreeable  Will order a dose of 1gram Ancef & 500mg IV Azithromycin

## 2018-04-23 NOTE — OB LABOR/OXYTOCIN SAFETY PROGRESS
Labor Progress Note - Mary Cordero 29 y o  female MRN: [id number]    Unit/Bed#: -01 Encounter: [id number]    Obstetric History       T0      L0     SAB0   TAB0   Ectopic0   Multiple0   Live Births0      Gestational Age: 36w2d  Dose (gail-units/min) Oxytocin: 0 gail-units/min  Contraction Frequency (minutes): 1 5-2  Contraction Quality: Moderate  Tachysystole: No   Dilation: 10  Dilation Complete Date: 18  Dilation Complete Time: 0210  Effacement (%): 100  Station: 1  Baseline Rate: 125 bpm  Fetal Heart Rate: 152 BPM  FHR Category: Category II          Notes/comments:     Patient is complete and +1-+2 station  Vertex is ANYI and is asynclitic  Patient is feeling pressure, will try to labor down and begin pushing in 1 hour  FHT is currently minimal variability, but has periods of moderate variability with accelerations  Mostly early decelerations, occasional intermittent variable and late decelerations              Susannah Strauss MD 2018 2:35 AM

## 2018-04-24 LAB
BASOPHILS # BLD AUTO: 0.01 THOUSANDS/ΜL (ref 0–0.1)
BASOPHILS NFR BLD AUTO: 0 % (ref 0–1)
EOSINOPHIL # BLD AUTO: 0.07 THOUSAND/ΜL (ref 0–0.61)
EOSINOPHIL NFR BLD AUTO: 0 % (ref 0–6)
ERYTHROCYTE [DISTWIDTH] IN BLOOD BY AUTOMATED COUNT: 13.8 % (ref 11.6–15.1)
HCT VFR BLD AUTO: 32.8 % (ref 34.8–46.1)
HGB BLD-MCNC: 11.6 G/DL (ref 11.5–15.4)
LYMPHOCYTES # BLD AUTO: 2.43 THOUSANDS/ΜL (ref 0.6–4.47)
LYMPHOCYTES NFR BLD AUTO: 15 % (ref 14–44)
MCH RBC QN AUTO: 33.1 PG (ref 26.8–34.3)
MCHC RBC AUTO-ENTMCNC: 35.4 G/DL (ref 31.4–37.4)
MCV RBC AUTO: 94 FL (ref 82–98)
MONOCYTES # BLD AUTO: 0.95 THOUSAND/ΜL (ref 0.17–1.22)
MONOCYTES NFR BLD AUTO: 6 % (ref 4–12)
NEUTROPHILS # BLD AUTO: 12.7 THOUSANDS/ΜL (ref 1.85–7.62)
NEUTS SEG NFR BLD AUTO: 79 % (ref 43–75)
NRBC BLD AUTO-RTO: 0 /100 WBCS
PLATELET # BLD AUTO: 150 THOUSANDS/UL (ref 149–390)
PMV BLD AUTO: 11.3 FL (ref 8.9–12.7)
RBC # BLD AUTO: 3.5 MILLION/UL (ref 3.81–5.12)
WBC # BLD AUTO: 16.23 THOUSAND/UL (ref 4.31–10.16)

## 2018-04-24 PROCEDURE — 85025 COMPLETE CBC W/AUTO DIFF WBC: CPT | Performed by: OBSTETRICS & GYNECOLOGY

## 2018-04-24 PROCEDURE — 90707 MMR VACCINE SC: CPT | Performed by: OBSTETRICS & GYNECOLOGY

## 2018-04-24 RX ADMIN — DOCUSATE SODIUM 100 MG: 100 CAPSULE, LIQUID FILLED ORAL at 18:32

## 2018-04-24 RX ADMIN — ACETAMINOPHEN 650 MG: 325 TABLET, FILM COATED ORAL at 04:36

## 2018-04-24 RX ADMIN — KETOROLAC TROMETHAMINE 30 MG: 30 INJECTION, SOLUTION INTRAMUSCULAR at 06:41

## 2018-04-24 RX ADMIN — IBUPROFEN 600 MG: 600 TABLET ORAL at 21:54

## 2018-04-24 RX ADMIN — ACETAMINOPHEN 650 MG: 325 TABLET, FILM COATED ORAL at 16:23

## 2018-04-24 RX ADMIN — IBUPROFEN 600 MG: 600 TABLET ORAL at 12:58

## 2018-04-24 RX ADMIN — DOCUSATE SODIUM 100 MG: 100 CAPSULE, LIQUID FILLED ORAL at 12:59

## 2018-04-24 RX ADMIN — MEASLES, MUMPS, AND RUBELLA VIRUS VACCINE LIVE 0.5 ML: 1000; 12500; 1000 INJECTION, POWDER, LYOPHILIZED, FOR SUSPENSION SUBCUTANEOUS at 18:30

## 2018-04-24 NOTE — PROGRESS NOTES
04/24/18 1000   Plan of Care   Comments PT celebrating birth of baby (nicu)   Assessment Completed by: Unit visit

## 2018-04-24 NOTE — PROGRESS NOTES
Progress Note - OB/GYN   Para Sat 29 y o  female MRN: [id number]  Unit/Bed#:  320-01 Encounter: [id number]    Assessment:  Post Op Day #1 s/p LTCS for NRFHT at 40w4d, stable  GBS carrier  Baby in NICU with MAS  Rubella non-immune    Plan:  1)Continue routine post-op care  Capone catheter removed last night and passed voiding trial  Pre-op hgb 13 0-->11 6; VSS, asymptomatic  Regular diet  Encouraged ambulation as tolerated  Pain control with PO medications, Duramorph  at 0600  DVT ppx: SCDs in place  2) Baby in NICU for Meconium aspiration syndrome  3) Rubella non-immune  MMR vaccine ordered   4) Dispo:   VSS, anticipate D/C home POD#4    Subjective/Objective   Chief Complaint:     Post delivery    Subjective:   Pt states she is doing okay, has been visiting baby in NICU  States he is getting better every day  They are still helping him breathe  States she has noticed an increase in her pain overnight, and took Tylenol that helped  Pt aware Duramorph has  this morning and she can now take PO  medications for her pain  Pain: yes, improved with meds  Tolerating PO: yes  Voiding: yes  Flatus: yes  BM: no  Ambulating: yes  Breastfeeding:  No, pumping  Chest pain: no  Shortness of breath: no  Leg pain: no  Lochia: minimal    Objective:     Vitals: BP 90/51 (BP Location: Right arm)   Pulse 83   Temp 97 9 °F (36 6 °C) (Oral)   Resp 18   Ht 5' 2" (1 575 m)   Wt 77 1 kg (170 lb)   LMP 2017   SpO2 99%   Breastfeeding?  No   BMI 31 09 kg/m²       Intake/Output Summary (Last 24 hours) at 18 0642  Last data filed at 18 2300   Gross per 24 hour   Intake          1491 28 ml   Output             1850 ml   Net          -358 72 ml       Lab Results   Component Value Date    WBC 16 23 (H) 2018    HGB 11 6 2018    HCT 32 8 (L) 2018    MCV 94 2018     2018       Meds/Allergies   Current Facility-Administered Medications   Medication Dose Route Frequency    acetaminophen (TYLENOL) tablet 650 mg  650 mg Oral Q6H PRN    benzocaine-menthol-lanolin-aloe (DERMOPLAST) 20-0 5 % topical spray 1 application  1 application Topical I0W PRN    calcium carbonate (TUMS) chewable tablet 1,000 mg  1,000 mg Oral Daily PRN    diphenhydrAMINE (BENADRYL) tablet 25 mg  25 mg Oral Q6H PRN    docusate sodium (COLACE) capsule 100 mg  100 mg Oral BID    fentaNYL (SUBLIMAZE) injection 25 mcg  25 mcg Intravenous Q5 Min PRN    hydrocortisone 1 % cream 1 application  1 application Topical Daily PRN    HYDROmorphone (DILAUDID) injection 0 4 mg  0 4 mg Intravenous Q5 Min PRN    HYDROmorphone (DILAUDID) injection 1 mg  1 mg Intravenous Q2H PRN    ibuprofen (MOTRIN) tablet 600 mg  600 mg Oral Q6H PRN    measles-mumps-rubella (M-M-R II) subcutaneous injection 0 5 mL  0 5 mL Subcutaneous Once    ondansetron (ZOFRAN) injection 4 mg  4 mg Intravenous Q8H PRN    oxyCODONE-acetaminophen (PERCOCET) 5-325 mg per tablet 1 tablet  1 tablet Oral Q4H PRN    oxyCODONE-acetaminophen (PERCOCET) 5-325 mg per tablet 2 tablet  2 tablet Oral Q4H PRN    simethicone (MYLICON) chewable tablet 80 mg  80 mg Oral 4x Daily PRN    witch hazel-glycerin (TUCKS) topical pad 1 pad  1 pad Topical Q4H PRN       Physical Exam:     Gen: AAOx3, NAD; tearful when speaking about baby  CV: RRR  Lungs: CTA b/l  Abd: Soft, non-tender, non-distended, no rebound or guarding  Uterine fundus firm and non-tender,  at the umbilicus   Incision: c/d/i with no signs of infectionl; dressing removed this AM  Minimal lochia on pad  Ext: Non tender, SCDs in place, negative Yue's sign    Juan Antonio Horvath MD  OBGYN, PGY-1  4/24/2018 6:48 AM

## 2018-04-24 NOTE — LACTATION NOTE
Provided belly band with holes cut out for flanges of breast pump to encourage handsfree/handson pumping to increase supply  Furnished with a breast pumping log, explained use

## 2018-04-25 RX ADMIN — DOCUSATE SODIUM 100 MG: 100 CAPSULE, LIQUID FILLED ORAL at 17:40

## 2018-04-25 RX ADMIN — WITCH HAZEL 1 PAD: 500 SOLUTION RECTAL; TOPICAL at 17:40

## 2018-04-25 RX ADMIN — HYDROCORTISONE 1 APPLICATION: 1 CREAM TOPICAL at 17:40

## 2018-04-25 RX ADMIN — IBUPROFEN 600 MG: 600 TABLET ORAL at 05:24

## 2018-04-25 RX ADMIN — IBUPROFEN 600 MG: 600 TABLET ORAL at 15:48

## 2018-04-25 RX ADMIN — DOCUSATE SODIUM 100 MG: 100 CAPSULE, LIQUID FILLED ORAL at 08:43

## 2018-04-25 NOTE — PROGRESS NOTES
Progress Note - OB/GYN   Rubia Luo 29 y o  female MRN: [id number]  Unit/Bed#:  320-01 Encounter: [id number]    Assessment:   Post Op Day #2 s/p 1LTCS for NRFHT, stable    Plan:  1  Continue routine post partum care   -Encourage ambulation   -Encourage breastfeeding   -Hb: 13 0 -> 11 6, VSS, asymptomatic  2  Baby in NICU for meconium aspiration syndrome  3  Rubella Nonimmuine   -Received MMR  4  Anticipate d/c POD 4    Subjective/Objective   Chief Complaint:     Post delivery    Subjective:     Pain: yes, cramping, improved with meds  Tolerating PO: yes  Voiding: yes  Flatus: yes  BM: no  Ambulating: yes  Breastfeeding:  Yes  Chest pain: no  Shortness of breath: no  Leg pain: no  Lochia: minimal    Objective:     Vitals: /79   Pulse 70   Temp 97 6 °F (36 4 °C) (Oral)   Resp 20   Ht 5' 2" (1 575 m)   Wt 77 1 kg (170 lb)   LMP 07/12/2017   SpO2 98%   Breastfeeding?  Yes   BMI 31 09 kg/m²     No intake or output data in the 24 hours ending 04/25/18 0609    Lab Results   Component Value Date    WBC 16 23 (H) 04/24/2018    HGB 11 6 04/24/2018    HCT 32 8 (L) 04/24/2018    MCV 94 04/24/2018     04/24/2018       Current Facility-Administered Medications   Medication Dose Route Frequency    acetaminophen (TYLENOL) tablet 650 mg  650 mg Oral Q6H PRN    benzocaine-menthol-lanolin-aloe (DERMOPLAST) 20-0 5 % topical spray 1 application  1 application Topical P0M PRN    calcium carbonate (TUMS) chewable tablet 1,000 mg  1,000 mg Oral Daily PRN    diphenhydrAMINE (BENADRYL) tablet 25 mg  25 mg Oral Q6H PRN    docusate sodium (COLACE) capsule 100 mg  100 mg Oral BID    fentaNYL (SUBLIMAZE) injection 25 mcg  25 mcg Intravenous Q5 Min PRN    hydrocortisone 1 % cream 1 application  1 application Topical Daily PRN    HYDROmorphone (DILAUDID) injection 0 4 mg  0 4 mg Intravenous Q5 Min PRN    HYDROmorphone (DILAUDID) injection 1 mg  1 mg Intravenous Q2H PRN    ibuprofen (MOTRIN) tablet 600 mg  600 mg Oral Q6H PRN    ondansetron (ZOFRAN) injection 4 mg  4 mg Intravenous Q8H PRN    oxyCODONE-acetaminophen (PERCOCET) 5-325 mg per tablet 1 tablet  1 tablet Oral Q4H PRN    oxyCODONE-acetaminophen (PERCOCET) 5-325 mg per tablet 2 tablet  2 tablet Oral Q4H PRN    simethicone (MYLICON) chewable tablet 80 mg  80 mg Oral 4x Daily PRN    witch hazel-glycerin (TUCKS) topical pad 1 pad  1 pad Topical Q4H PRN       Physical Exam:     Gen: AAOx3, NAD  CV: RRR  Lungs: CTA b/l  Abd: Soft, non-tender, non-distended, no rebound or guarding  Uterine fundus firm and non-tender, at umbilicus, incision is c/d/i  Ext: Non tender    Bianca Shone, PGY-1  OB/GYN  4/25/2018  6:09 AM

## 2018-04-25 NOTE — LACTATION NOTE
Maeve Cm c/o "No milk comes out in the pump "  Observed pumping  Attempted manual expression  Maeve Cm appeared relieved, and satisfied to see over 5 ml's of milk hand expressed

## 2018-04-26 RX ORDER — IBUPROFEN 600 MG/1
TABLET ORAL
Status: COMPLETED
Start: 2018-04-26 | End: 2018-04-26

## 2018-04-26 RX ADMIN — IBUPROFEN 600 MG: 600 TABLET ORAL at 02:15

## 2018-04-26 RX ADMIN — IBUPROFEN 600 MG: 600 TABLET ORAL at 17:16

## 2018-04-26 RX ADMIN — DOCUSATE SODIUM 100 MG: 100 CAPSULE, LIQUID FILLED ORAL at 17:16

## 2018-04-26 RX ADMIN — IBUPROFEN 600 MG: 600 TABLET ORAL at 09:11

## 2018-04-26 RX ADMIN — WITCH HAZEL 1 PAD: 500 SOLUTION RECTAL; TOPICAL at 17:17

## 2018-04-26 RX ADMIN — DOCUSATE SODIUM 100 MG: 100 CAPSULE, LIQUID FILLED ORAL at 09:11

## 2018-04-26 RX ADMIN — IBUPROFEN 600 MG: 600 TABLET ORAL at 23:34

## 2018-04-26 NOTE — LACTATION NOTE
This note was copied from a baby's chart  Met with parents in the NICU  Genesis Jin is pumping for her son, but stated that pumping doesn't seem to be going very well  She also stated that she began hand expressing her milk and that seems to be working better for her than pumping  I encouraged her to continue with what is working best for her  Reassuring her that hand expression is a very effective method of milk removal   I will work with this family when the infant's respiratory status has improved enough to PO feed

## 2018-04-26 NOTE — LACTATION NOTE
Lonny Barragan was not able to obtain her pump from home for education on pump today  She will attempt to have it brought from home for tomorrow

## 2018-04-26 NOTE — PROGRESS NOTES
Progress Note - OB/GYN   Elizabeth Steinberg 29 y o  female MRN: [id number]  Unit/Bed#: -01 Encounter: [id number]    Assessment:   Post Op Day #3 s/p 1LTCS for NRFHT, stable    Plan:  1  Continue routine post partum care   -Encourage ambulation   -Encourage breastfeeding   -Hb: 13 0 -> 11 6, VSS, asymptomatic  2  Baby in NICU for meconium aspiration syndrome  3  Rubella Nonimmuine   -Received MMR  4  Anticipate discharge tomorrow    Subjective/Objective   Chief Complaint:     Post delivery    Subjective:     Pain: yes, cramping, improved with meds  Tolerating PO: yes  Voiding: yes  Flatus: yes  BM: yes  Ambulating: yes  Breastfeeding:  Yes  Chest pain: no  Shortness of breath: no  Leg pain: no  Lochia: minimal    Objective:     Vitals: BP (!) 82/50 (BP Location: Right arm) Comment: notified RN Srinivasan Jasso; pt sleeping and asymptomatic  Pulse 77   Temp 98 7 °F (37 1 °C) (Oral)   Resp 18   Ht 5' 2" (1 575 m)   Wt 77 1 kg (170 lb)   LMP 07/12/2017   SpO2 98%   Breastfeeding?  Yes   BMI 31 09 kg/m²     No intake or output data in the 24 hours ending 04/26/18 0634    Lab Results   Component Value Date    WBC 16 23 (H) 04/24/2018    HGB 11 6 04/24/2018    HCT 32 8 (L) 04/24/2018    MCV 94 04/24/2018     04/24/2018       Current Facility-Administered Medications   Medication Dose Route Frequency    acetaminophen (TYLENOL) tablet 650 mg  650 mg Oral Q6H PRN    benzocaine-menthol-lanolin-aloe (DERMOPLAST) 20-0 5 % topical spray 1 application  1 application Topical Y6T PRN    calcium carbonate (TUMS) chewable tablet 1,000 mg  1,000 mg Oral Daily PRN    diphenhydrAMINE (BENADRYL) tablet 25 mg  25 mg Oral Q6H PRN    docusate sodium (COLACE) capsule 100 mg  100 mg Oral BID    fentaNYL (SUBLIMAZE) injection 25 mcg  25 mcg Intravenous Q5 Min PRN    hydrocortisone 1 % cream 1 application  1 application Topical Daily PRN    HYDROmorphone (DILAUDID) injection 0 4 mg  0 4 mg Intravenous Q5 Min PRN    HYDROmorphone (DILAUDID) injection 1 mg  1 mg Intravenous Q2H PRN    ibuprofen (MOTRIN) tablet 600 mg  600 mg Oral Q6H PRN    ondansetron (ZOFRAN) injection 4 mg  4 mg Intravenous Q8H PRN    oxyCODONE-acetaminophen (PERCOCET) 5-325 mg per tablet 1 tablet  1 tablet Oral Q4H PRN    oxyCODONE-acetaminophen (PERCOCET) 5-325 mg per tablet 2 tablet  2 tablet Oral Q4H PRN    simethicone (MYLICON) chewable tablet 80 mg  80 mg Oral 4x Daily PRN    witch hazel-glycerin (TUCKS) topical pad 1 pad  1 pad Topical Q4H PRN       Physical Exam:     Gen: AAOx3, NAD  CV: RRR  Lungs: CTA b/l  Abd: Soft, non-tender, non-distended, no rebound or guarding  Uterine fundus firm and non-tender, 1 cm below the umbilicus, incision is c/d/i  Ext: Non tender    Analilia Johnson, PGY-1  OB/GYN  4/26/2018  6:34 AM

## 2018-04-27 VITALS
HEART RATE: 75 BPM | BODY MASS INDEX: 31.28 KG/M2 | DIASTOLIC BLOOD PRESSURE: 92 MMHG | RESPIRATION RATE: 18 BRPM | HEIGHT: 62 IN | OXYGEN SATURATION: 98 % | WEIGHT: 170 LBS | TEMPERATURE: 97.6 F | SYSTOLIC BLOOD PRESSURE: 125 MMHG

## 2018-04-27 RX ORDER — SIMETHICONE 80 MG
80 TABLET,CHEWABLE ORAL 4 TIMES DAILY PRN
Qty: 30 TABLET | Refills: 0
Start: 2018-04-27 | End: 2018-04-27 | Stop reason: HOSPADM

## 2018-04-27 RX ORDER — IBUPROFEN 600 MG/1
600 TABLET ORAL EVERY 6 HOURS PRN
Qty: 30 TABLET | Refills: 0
Start: 2018-04-27 | End: 2021-03-04 | Stop reason: ALTCHOICE

## 2018-04-27 RX ORDER — DOCUSATE SODIUM 100 MG/1
100 CAPSULE, LIQUID FILLED ORAL 2 TIMES DAILY
Qty: 10 CAPSULE | Refills: 0
Start: 2018-04-27 | End: 2021-03-04 | Stop reason: ALTCHOICE

## 2018-04-27 RX ADMIN — IBUPROFEN 600 MG: 600 TABLET ORAL at 06:18

## 2018-04-27 RX ADMIN — DOCUSATE SODIUM 100 MG: 100 CAPSULE, LIQUID FILLED ORAL at 08:46

## 2018-04-27 NOTE — LACTATION NOTE
Loki Spring is doing better at feeling coordinated with pumping  Provided discharge booklet for Loki Spring to help set early expectations for milestones Facundo Hurtado gets discharged from hospital in the next few days  Powerpoints given on mom/ care class and breastfeeding class at patient request     Discussed s/s engorgement and how to manage with medications and cool compresses as well as s/s mastitis and when to contact physician  Encoraged MOB and FOB to call for assistance, questions and concerns  Extension number for inpatient lactation support provided

## 2018-04-27 NOTE — PLAN OF CARE
DISCHARGE PLANNING     Discharge to home or other facility with appropriate resources Adequate for Discharge        DISCHARGE PLANNING - CARE MANAGEMENT     Discharge to post-acute care or home with appropriate resources Adequate for Discharge        INFECTION - ADULT     Absence or prevention of progression during hospitalization Adequate for Discharge     Absence of fever/infection during neutropenic period Adequate for Discharge        Knowledge Deficit     Patient/family/caregiver demonstrates understanding of disease process, treatment plan, medications, and discharge instructions Adequate for Discharge        PAIN - ADULT     Verbalizes/displays adequate comfort level or baseline comfort level Adequate for Discharge        POSTPARTUM     Experiences normal postpartum course Adequate for Discharge     Appropriate maternal -  bonding Adequate for Discharge     Establishment of infant feeding pattern Adequate for Discharge     Incision(s), wounds(s) or drain site(s) healing without S/S of infection Adequate for Discharge        SAFETY ADULT     Patient will remain free of falls Adequate for Discharge     Maintain or return to baseline ADL function Adequate for Discharge     Maintain or return mobility status to optimal level Adequate for Discharge

## 2018-04-30 ENCOUNTER — TELEPHONE (OUTPATIENT)
Dept: FAMILY MEDICINE CLINIC | Facility: CLINIC | Age: 33
End: 2018-04-30

## 2018-04-30 NOTE — TELEPHONE ENCOUNTER
Pt was D/C from the hospital on 04/27/2018 and I called today to see if she wanted to follow up with us or not  I LMOM because there was no answer

## 2018-05-02 LAB — PLACENTA IN STORAGE: NORMAL

## 2018-05-04 ENCOUNTER — OFFICE VISIT (OUTPATIENT)
Dept: GYNECOLOGY | Facility: CLINIC | Age: 33
End: 2018-05-04

## 2018-05-04 VITALS
DIASTOLIC BLOOD PRESSURE: 70 MMHG | WEIGHT: 156.6 LBS | HEIGHT: 62 IN | SYSTOLIC BLOOD PRESSURE: 120 MMHG | BODY MASS INDEX: 28.82 KG/M2

## 2018-05-04 DIAGNOSIS — Z48.89 POSTOPERATIVE VISIT: Primary | ICD-10-CM

## 2018-05-04 PROCEDURE — 99024 POSTOP FOLLOW-UP VISIT: CPT | Performed by: OBSTETRICS & GYNECOLOGY

## 2018-05-04 NOTE — PROGRESS NOTES
Assessment/Plan:  Normal postop visit       Diagnoses and all orders for this visit:    Postoperative visit     delivery delivered          Subjective:      Patient ID: Gem Guillen is a 29 y o  female  Cuney Counter is here for her postoperative visit  She is doing well  Normal bowel and urinary function  Vesta Landaverde is still in the NICU recovering from meconium aspiration syndrome  The  was   Svetlana Fuller was not happy with their care during labor and delivery and will be composing a constructive letter  The postpartum care and NICU care were considered fantastic  The following portions of the patient's history were reviewed and updated as appropriate:   She  has a past medical history of Varicella and Visual impairment  PMH:  G1, P1; C/S- FTD/fetal intolerance of labor, son with MAS, NICU for 2 wks  18  Patient Active Problem List    Diagnosis Date Noted    Postoperative visit 2018     delivery delivered 2018    40 weeks gestation of pregnancy 2018    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    Rubella non-immune status, antepartum 2018     She  has a past surgical history that includes No past surgeries; [place] tooth extraction; and pr  delivery only (N/A, 2018)  Her family history includes Colon cancer in her maternal grandmother; No Known Problems in her brother, father, maternal grandfather, mother, paternal grandfather, paternal grandmother, and sister  She  reports that she has never smoked  She has never used smokeless tobacco  She reports that she drinks about 1 2 - 1 8 oz of alcohol per week   She reports that she does not use drugs    Current Outpatient Prescriptions   Medication Sig Dispense Refill    acetaminophen (TYLENOL) 500 mg tablet Take 500 mg by mouth every 6 (six) hours as needed for mild pain      diphenhydrAMINE (BENADRYL ALLERGY) 25 mg capsule Take 25 mg by mouth every 6 (six) hours as needed for itching      docusate sodium (COLACE) 100 mg capsule Take 1 capsule (100 mg total) by mouth 2 (two) times a day 10 capsule 0    famotidine (PEPCID) 20 mg tablet Take 20 mg by mouth daily        ibuprofen (MOTRIN) 600 mg tablet Take 1 tablet (600 mg total) by mouth every 6 (six) hours as needed for mild pain (cramping) 30 tablet 0    Prenatal Vit-Fe Fumarate-FA (PRENATAL VITAMIN PO) Take 1 tablet by mouth       No current facility-administered medications for this visit  Current Outpatient Prescriptions on File Prior to Visit   Medication Sig    acetaminophen (TYLENOL) 500 mg tablet Take 500 mg by mouth every 6 (six) hours as needed for mild pain    diphenhydrAMINE (BENADRYL ALLERGY) 25 mg capsule Take 25 mg by mouth every 6 (six) hours as needed for itching    docusate sodium (COLACE) 100 mg capsule Take 1 capsule (100 mg total) by mouth 2 (two) times a day    famotidine (PEPCID) 20 mg tablet Take 20 mg by mouth daily      ibuprofen (MOTRIN) 600 mg tablet Take 1 tablet (600 mg total) by mouth every 6 (six) hours as needed for mild pain (cramping)    Prenatal Vit-Fe Fumarate-FA (PRENATAL VITAMIN PO) Take 1 tablet by mouth     No current facility-administered medications on file prior to visit  She has No Known Allergies       Review of Systems   Constitutional: Negative for chills and fever  Night sweats   Gastrointestinal: Positive for abdominal pain  Negative for constipation, diarrhea and nausea  Genitourinary: Negative for difficulty urinating and dysuria  Musculoskeletal: Negative for arthralgias and back pain  Ambulating fine   Skin: Negative for color change  Objective:      /70 (BP Location: Right arm, Patient Position: Sitting, Cuff Size: Standard)   Ht 5' 2" (1 575 m)   Wt 71 kg (156 lb 9 6 oz)   LMP 07/12/2017   Breastfeeding? Yes   BMI 28 64 kg/m²          Physical Exam   Constitutional: She appears well-developed and well-nourished  No distress  Abdominal: Soft   She exhibits no distension  There is tenderness  There is no rebound and no guarding  Incision looks great  Healing well with appropriate tenderness  Diastasis recti present which bothers the patient is somewhat  This was explained

## 2018-05-17 NOTE — PROGRESS NOTES
Assessment/Plan:    Normal postpartum exam  Withdrawal for birth control  Will be having an annual exam at her place of employment     Diagnoses and all orders for this visit:    Encounter for postpartum visit     delivery delivered          Subjective:      Patient ID: Elizabeth Steinberg is a 29 y o  female  Rosa Lynn is here for her postpartum visit  She has intermittent light bleeding  She is trying to adjust to sleep  Her parents were here for 3 weeks  They were a great help but she avoided taking naps so she is could spend time with them  The returned to [place] 2 days ago  They used withdrawal successfully for 9 years before attempting conception  The following portions of the patient's history were reviewed and updated as appropriate:   She  has a past medical history of Varicella and Visual impairment  Patient Active Problem List    Diagnosis Date Noted    Encounter for postpartum visit 2018    Postoperative visit 2018     delivery delivered 2018    40 weeks gestation of pregnancy 2018    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    Rubella non-immune status, antepartum 2018     She  has a past surgical history that includes No past surgeries; [place] tooth extraction; and pr  delivery only (N/A, 2018)  PMH:   G1, P1; C/S 40w4d FTD, NR FHR 18  Her family history includes Colon cancer in her maternal grandmother; No Known Problems in her brother, father, maternal grandfather, mother, paternal grandfather, paternal grandmother, and sister  She  reports that she has never smoked  She has never used smokeless tobacco  She reports that she drinks about 1 2 - 1 8 oz of alcohol per week   She reports that she does not use drugs    Current Outpatient Prescriptions   Medication Sig Dispense Refill    acetaminophen (TYLENOL) 500 mg tablet Take 500 mg by mouth every 6 (six) hours as needed for mild pain      diphenhydrAMINE (BENADRYL ALLERGY) 25 mg capsule Take 25 mg by mouth every 6 (six) hours as needed for itching      docusate sodium (COLACE) 100 mg capsule Take 1 capsule (100 mg total) by mouth 2 (two) times a day 10 capsule 0    famotidine (PEPCID) 20 mg tablet Take 20 mg by mouth daily        ibuprofen (MOTRIN) 600 mg tablet Take 1 tablet (600 mg total) by mouth every 6 (six) hours as needed for mild pain (cramping) 30 tablet 0    Prenatal Vit-Fe Fumarate-FA (PRENATAL VITAMIN PO) Take 1 tablet by mouth       No current facility-administered medications for this visit  Current Outpatient Prescriptions on File Prior to Visit   Medication Sig    acetaminophen (TYLENOL) 500 mg tablet Take 500 mg by mouth every 6 (six) hours as needed for mild pain    diphenhydrAMINE (BENADRYL ALLERGY) 25 mg capsule Take 25 mg by mouth every 6 (six) hours as needed for itching    docusate sodium (COLACE) 100 mg capsule Take 1 capsule (100 mg total) by mouth 2 (two) times a day    famotidine (PEPCID) 20 mg tablet Take 20 mg by mouth daily      ibuprofen (MOTRIN) 600 mg tablet Take 1 tablet (600 mg total) by mouth every 6 (six) hours as needed for mild pain (cramping)    Prenatal Vit-Fe Fumarate-FA (PRENATAL VITAMIN PO) Take 1 tablet by mouth     No current facility-administered medications on file prior to visit  She has No Known Allergies  Fadumo Chua presents for her postpartum exam   Admitted 4/22, D/C 4/27  She delivered 4/23 C/S for FTD, Non Reassuring FHR  Labor lasted 26 hrs  Anesthesia  Epidural 6 cm  M - Jessica Sandoval "Paddy"  Wt 7-15               Apgars  1/7  Complications  MAS- NICU stay till 5/4-discharged  Currently she has normal bowel and urinary habits  She is breast feeding  She notes no real complaints other than large breasts which she hopes get smaller    PE:  LMP 07/12/2017   Abd-nontender, no palpable masses  Ext-no edema, negative Homans  Pelvis-well-healed perineum  Normal vulva and vagina  The cervix is parous    The uterus is 6 week size   The adnexal is nontender  Birth control options discussed including lactational amenorrhea, withdrawal, condoms, oral contraceptives, DMPA/Nexplanon, IUDs including [facility], [facility] and [facility]  [place] PP depression scale- 6      Assessment and Plan:    Diagnoses and all orders for this visit:    Encounter for postpartum visit     delivery delivered            No Follow-up on file  Review of Systems   Constitutional: Negative for chills and fever  Cardiovascular: Negative for chest pain and leg swelling  Gastrointestinal: Negative for abdominal distention, abdominal pain, constipation, diarrhea, nausea and vomiting  Genitourinary: Positive for vaginal bleeding  Negative for difficulty urinating, dysuria and frequency  Musculoskeletal: Negative for arthralgias  Objective:      LMP 2017          Physical Exam   Constitutional: She appears well-developed and well-nourished  No distress  HENT:   Head: Normocephalic and atraumatic  Pulmonary/Chest: Effort normal    Abdominal: Soft  She exhibits no distension and no mass  There is no tenderness  There is no rebound and no guarding  Incision healing well   Genitourinary:   Genitourinary Comments: Normal vulva, and cervix  Uterus is top-normal size and mid plane

## 2018-05-18 ENCOUNTER — POSTPARTUM VISIT (OUTPATIENT)
Dept: GYNECOLOGY | Facility: CLINIC | Age: 33
End: 2018-05-18
Payer: COMMERCIAL

## 2018-05-18 VITALS
DIASTOLIC BLOOD PRESSURE: 60 MMHG | BODY MASS INDEX: 27.4 KG/M2 | HEART RATE: 88 BPM | WEIGHT: 148.9 LBS | HEIGHT: 62 IN | SYSTOLIC BLOOD PRESSURE: 100 MMHG

## 2018-05-18 PROCEDURE — 99024 POSTOP FOLLOW-UP VISIT: CPT | Performed by: OBSTETRICS & GYNECOLOGY

## 2021-01-14 DIAGNOSIS — Z23 ENCOUNTER FOR IMMUNIZATION: ICD-10-CM

## 2021-01-15 ENCOUNTER — IMMUNIZATIONS (OUTPATIENT)
Dept: FAMILY MEDICINE CLINIC | Facility: HOSPITAL | Age: 36
End: 2021-01-15

## 2021-01-15 DIAGNOSIS — Z23 ENCOUNTER FOR IMMUNIZATION: Primary | ICD-10-CM

## 2021-01-15 PROCEDURE — 91300 SARS-COV-2 / COVID-19 MRNA VACCINE (PFIZER-BIONTECH) 30 MCG: CPT

## 2021-01-15 PROCEDURE — 0001A SARS-COV-2 / COVID-19 MRNA VACCINE (PFIZER-BIONTECH) 30 MCG: CPT

## 2021-02-04 ENCOUNTER — IMMUNIZATIONS (OUTPATIENT)
Dept: FAMILY MEDICINE CLINIC | Facility: HOSPITAL | Age: 36
End: 2021-02-04

## 2021-02-04 DIAGNOSIS — Z23 ENCOUNTER FOR IMMUNIZATION: Primary | ICD-10-CM

## 2021-02-04 PROCEDURE — 91300 SARS-COV-2 / COVID-19 MRNA VACCINE (PFIZER-BIONTECH) 30 MCG: CPT

## 2021-02-04 PROCEDURE — 0002A SARS-COV-2 / COVID-19 MRNA VACCINE (PFIZER-BIONTECH) 30 MCG: CPT

## 2021-03-04 ENCOUNTER — ANNUAL EXAM (OUTPATIENT)
Dept: OBGYN CLINIC | Facility: CLINIC | Age: 36
End: 2021-03-04
Payer: COMMERCIAL

## 2021-03-04 VITALS
DIASTOLIC BLOOD PRESSURE: 80 MMHG | HEIGHT: 62 IN | SYSTOLIC BLOOD PRESSURE: 124 MMHG | BODY MASS INDEX: 20.8 KG/M2 | WEIGHT: 113 LBS

## 2021-03-04 DIAGNOSIS — Z01.419 WOMEN'S ANNUAL ROUTINE GYNECOLOGICAL EXAMINATION: Primary | ICD-10-CM

## 2021-03-04 PROCEDURE — G0145 SCR C/V CYTO,THINLAYER,RESCR: HCPCS | Performed by: NURSE PRACTITIONER

## 2021-03-04 PROCEDURE — S0612 ANNUAL GYNECOLOGICAL EXAMINA: HCPCS | Performed by: NURSE PRACTITIONER

## 2021-03-04 PROCEDURE — 87624 HPV HI-RISK TYP POOLED RSLT: CPT | Performed by: NURSE PRACTITIONER

## 2021-03-04 NOTE — PROGRESS NOTES
Subjective    HPI:     Jessica Traore is a 37 y o  female  She is a [place] 1 Para 1, with a CS  Her menstrual cycles are regular and predictable  Her current method of contraception includes withdrawal  She complains of decrease libido  She denies /GI and Gyn complaints  She feels safe at home  She admits to depression/anxiety - she manages with acupuncture and walks  Medical, surgical and family history reviewed  Her dental care is up-to-date  She eats a healthy diet and exercises regularly  She is happy with her weight  Gynecologic History    Patient's last menstrual period was 2021  Last Pap:  - have been normal per patient      Obstetric History    OB History    Para Term  AB Living   1 1 1     1   SAB TAB Ectopic Multiple Live Births         0 1      # Outcome Date GA Lbr Marcin/2nd Weight Sex Delivery Anes PTL Lv   1 Term 18 40w4d / 03:23 3585 g (7 lb 14 5 oz) M CS-LTranv EPI N KITTY      Complications: Fetal Intolerance       The following portions of the patient's history were reviewed and updated as appropriate: allergies, current medications, past family history, past medical history, past social history, past surgical history and problem list     Review of Systems    Pertinent items are noted in HPI  Objective    Physical Exam  Constitutional:       Appearance: Normal appearance  She is well-developed  Genitourinary:      Pelvic exam was performed with patient in the lithotomy position  Vulva, inguinal canal, urethra, bladder, vagina, uterus, right adnexa and left adnexa normal       No posterior fourchette tenderness, injury, rash or lesion present  Cervix is not parous  No cervical motion tenderness, discharge, friability, lesion, erythema, bleeding, polyp or nabothian cyst       Uterus is anteverted  No right or left adnexal mass present  Right adnexa not tender or full  Left adnexa not tender or full     HENT:      Head: Normocephalic and atraumatic  Neck:      Musculoskeletal: Neck supple  Thyroid: No thyromegaly  Cardiovascular:      Rate and Rhythm: Normal rate and regular rhythm  Heart sounds: Normal heart sounds, S1 normal and S2 normal    Pulmonary:      Effort: Pulmonary effort is normal       Breath sounds: Normal breath sounds  Chest:      Breasts: Breasts are symmetrical          Right: Normal  No inverted nipple, mass, nipple discharge, skin change or tenderness  Left: Normal  No inverted nipple, mass, nipple discharge, skin change or tenderness  Abdominal:      General: Bowel sounds are normal  There is no distension  Palpations: Abdomen is soft  There is no mass  Tenderness: There is no abdominal tenderness  There is no guarding  Lymphadenopathy:      Cervical: No cervical adenopathy  Upper Body:      Right upper body: No supraclavicular or axillary adenopathy  Left upper body: No supraclavicular or axillary adenopathy  Neurological:      Mental Status: She is alert  Skin:     General: Skin is warm and dry  Findings: No rash  Psychiatric:         Attention and Perception: Attention and perception normal          Mood and Affect: Mood and affect normal          Speech: Speech normal          Behavior: Behavior is cooperative  Thought Content: Thought content normal          Cognition and Memory: Cognition and memory normal          Judgment: Judgment normal    Vitals signs and nursing note reviewed  Assessment and Plan    Darlin Mckenna was seen today for gynecologic exam     Diagnoses and all orders for this visit:    Women's annual routine gynecological examination  -     Liquid-based pap, screening      Patient informed of a Stable GYN exam  A pap smear was performed  I have discussed the importance of exercise and healthy diet as well as adequate intake of calcium and vitamin D  The current ASCCP guidelines were reviewed   The low risk patient will receive pap smear screening every 3 years until the age of 34 and then every 3 to 5 years with HPV co-testing from the ages of 33-67  I emphasized the importance of an annual pelvic and breast exam  A yearly mammogram is recommended for breast cancer screening starting at age 36  All questions have been answered to her satisfaction  Follow up in: 1 year

## 2021-03-05 LAB
HPV HR 12 DNA CVX QL NAA+PROBE: NEGATIVE
HPV16 DNA CVX QL NAA+PROBE: NEGATIVE
HPV18 DNA CVX QL NAA+PROBE: NEGATIVE

## 2021-03-09 LAB
LAB AP GYN PRIMARY INTERPRETATION: NORMAL
Lab: NORMAL

## 2021-10-21 ENCOUNTER — IMMUNIZATIONS (OUTPATIENT)
Dept: FAMILY MEDICINE CLINIC | Facility: HOSPITAL | Age: 36
End: 2021-10-21

## 2021-10-21 DIAGNOSIS — Z23 ENCOUNTER FOR IMMUNIZATION: Primary | ICD-10-CM

## 2021-10-21 PROCEDURE — 91300 COVID-19 PFIZER VACC 0.3 ML: CPT

## 2021-10-21 PROCEDURE — 0001A COVID-19 PFIZER VACC 0.3 ML: CPT

## 2022-04-05 ENCOUNTER — ANNUAL EXAM (OUTPATIENT)
Dept: OBGYN CLINIC | Facility: CLINIC | Age: 37
End: 2022-04-05
Payer: COMMERCIAL

## 2022-04-05 VITALS
SYSTOLIC BLOOD PRESSURE: 118 MMHG | BODY MASS INDEX: 22.63 KG/M2 | WEIGHT: 123 LBS | HEIGHT: 62 IN | DIASTOLIC BLOOD PRESSURE: 78 MMHG

## 2022-04-05 DIAGNOSIS — Z01.419 ENCOUNTER FOR ANNUAL ROUTINE GYNECOLOGICAL EXAMINATION: Primary | ICD-10-CM

## 2022-04-05 PROCEDURE — G0145 SCR C/V CYTO,THINLAYER,RESCR: HCPCS | Performed by: OBSTETRICS & GYNECOLOGY

## 2022-04-05 PROCEDURE — S0612 ANNUAL GYNECOLOGICAL EXAMINA: HCPCS | Performed by: OBSTETRICS & GYNECOLOGY

## 2022-04-05 RX ORDER — MULTIVITAMIN
1 TABLET ORAL DAILY
COMMUNITY

## 2022-04-05 NOTE — PROGRESS NOTES
Assessment/Plan:  Pap smear done for cytology, reflex HPV  Encouraged self-breast examination as well as calcium supplementation  Reviewed breast cancer screening starting at age 36 with mammography  Discussed birth control options including hormonal versus nonhormonal, long-acting agents  At this point she will continue using rhythm method  Return to office in 1 year or p r n  No problem-specific Assessment & Plan notes found for this encounter  Diagnoses and all orders for this visit:    Encounter for annual routine gynecological examination  -     Liquid-based pap, screening    Other orders  -     Multiple Vitamin (multivitamin) tablet; Take 1 tablet by mouth daily          Subjective:      Patient ID: Janki Irene is a 31 y o  female  HPI     This is a pleasant 62-year-old female  ( x1, age 5) presents for gyn exam   She states her menstrual cycles are approximately every 4 weeks lasting 5 days with no breakthrough bleeding  She denies any changes in bowel or bladder function  She is sexually active and has been in a monogamous relationship for over 13 years  Pap smears have been normal   They are using rhythm method as form of contraception  She does recall being on OCPs in her 25s and had significant mood swings and irritability  At this point she is uncertain regarding future pregnancies  She states her last delivery was complicated by emergency  where her son was in respiratory distress, term pregnancy, NICU x2 weeks  She states overall her pregnancy was unremarkable up until day of delivery  Her  would be interested in another pregnancy  Although, respect her decision  He has discussed possible vasectomy as form of contraception  At this point they are both uncertain      She is going through counseling individual as well as couples counseling which has been beneficial     Patient is self- employed full-time as therapist   Her  is also a therapist     The following portions of the patient's history were reviewed and updated as appropriate: allergies, current medications, past family history, past medical history, past social history, past surgical history and problem list     Review of Systems   Constitutional: Negative for fatigue, fever and unexpected weight change  Respiratory: Negative for cough, chest tightness, shortness of breath and wheezing  Cardiovascular: Negative  Negative for chest pain and palpitations  Gastrointestinal: Negative  Negative for abdominal distention, abdominal pain, blood in stool, constipation, diarrhea, nausea and vomiting  Genitourinary: Negative  Negative for difficulty urinating, dyspareunia, dysuria, flank pain, frequency, genital sores, hematuria, pelvic pain, urgency, vaginal bleeding, vaginal discharge and vaginal pain  Skin: Negative for rash  Objective:      /78   Ht 5' 2" (1 575 m)   Wt 55 8 kg (123 lb)   LMP 03/26/2022   BMI 22 50 kg/m²          Physical Exam  Constitutional:       Appearance: Normal appearance  She is well-developed  Cardiovascular:      Rate and Rhythm: Normal rate and regular rhythm  Pulmonary:      Effort: Pulmonary effort is normal       Breath sounds: Normal breath sounds  Chest:   Breasts:      Right: No inverted nipple, mass, nipple discharge, skin change or tenderness  Left: No inverted nipple, mass, nipple discharge, skin change or tenderness  Abdominal:      General: Bowel sounds are normal  There is no distension  Palpations: Abdomen is soft  Tenderness: There is no abdominal tenderness  There is no guarding or rebound  Genitourinary:     Labia:         Right: No rash, tenderness or lesion  Left: No rash, tenderness or lesion  Vagina: Normal  No signs of injury  No vaginal discharge or tenderness  Cervix: No cervical motion tenderness, discharge, friability, lesion, erythema or cervical bleeding  Uterus: Not enlarged and not tender  Adnexa:         Right: No mass, tenderness or fullness  Left: No mass, tenderness or fullness  Neurological:      Mental Status: She is alert and oriented to person, place, and time     Psychiatric:         Behavior: Behavior normal

## 2022-04-12 LAB
LAB AP GYN PRIMARY INTERPRETATION: NORMAL
LAB AP LMP: NORMAL
Lab: NORMAL

## 2022-06-09 ENCOUNTER — OFFICE VISIT (OUTPATIENT)
Dept: FAMILY MEDICINE CLINIC | Facility: CLINIC | Age: 37
End: 2022-06-09
Payer: COMMERCIAL

## 2022-06-09 VITALS
DIASTOLIC BLOOD PRESSURE: 70 MMHG | BODY MASS INDEX: 21.9 KG/M2 | SYSTOLIC BLOOD PRESSURE: 110 MMHG | HEART RATE: 87 BPM | OXYGEN SATURATION: 98 % | WEIGHT: 119 LBS | TEMPERATURE: 98.2 F | HEIGHT: 62 IN

## 2022-06-09 DIAGNOSIS — Z13.220 LIPID SCREENING: ICD-10-CM

## 2022-06-09 DIAGNOSIS — Z00.00 ANNUAL PHYSICAL EXAM: Primary | ICD-10-CM

## 2022-06-09 DIAGNOSIS — Z11.59 NEED FOR HEPATITIS C SCREENING TEST: ICD-10-CM

## 2022-06-09 DIAGNOSIS — Z13.1 DIABETES MELLITUS SCREENING: ICD-10-CM

## 2022-06-09 PROBLEM — Z28.39 RUBELLA NON-IMMUNE STATUS, ANTEPARTUM: Status: RESOLVED | Noted: 2018-01-02 | Resolved: 2022-06-09

## 2022-06-09 PROBLEM — O09.899 RUBELLA NON-IMMUNE STATUS, ANTEPARTUM: Status: RESOLVED | Noted: 2018-01-02 | Resolved: 2022-06-09

## 2022-06-09 PROBLEM — Z3A.40 40 WEEKS GESTATION OF PREGNANCY: Status: RESOLVED | Noted: 2018-04-17 | Resolved: 2022-06-09

## 2022-06-09 PROBLEM — Z48.89 POSTOPERATIVE VISIT: Status: RESOLVED | Noted: 2018-05-04 | Resolved: 2022-06-09

## 2022-06-09 PROBLEM — Z22.330 GBS CARRIER: Status: RESOLVED | Noted: 2018-03-29 | Resolved: 2022-06-09

## 2022-06-09 PROCEDURE — 3008F BODY MASS INDEX DOCD: CPT | Performed by: FAMILY MEDICINE

## 2022-06-09 PROCEDURE — 1036F TOBACCO NON-USER: CPT | Performed by: FAMILY MEDICINE

## 2022-06-09 PROCEDURE — 99395 PREV VISIT EST AGE 18-39: CPT | Performed by: FAMILY MEDICINE

## 2022-06-09 PROCEDURE — 3725F SCREEN DEPRESSION PERFORMED: CPT | Performed by: FAMILY MEDICINE

## 2022-06-09 NOTE — ASSESSMENT & PLAN NOTE
Krysten Bentley and I had a nice long discussion of preventive care, and agreed to lipid and diabetes screening, as well as Hep C screening  Up to date on Paps  We discussed ongoing attention to nutrition, physical activity, stress management, and sleep

## 2022-06-09 NOTE — PROGRESS NOTES
Family Medicine Follow-Up Office Visit  Janki Irene 31 y o  female   MRN: [id number] : 1985  ENCOUNTER: 2022 10:02 AM    Assessment and Plan   Annual physical exam  Zenaida Novak and I had a nice long discussion of preventive care, and agreed to lipid and diabetes screening, as well as Hep C screening  Up to date on Paps  We discussed ongoing attention to nutrition, physical activity, stress management, and sleep  I will call or message Zenaida Novak to discuss her results    Chief Complaint     Chief Complaint   Patient presents with    Establish Care       History of Present Illness   Janki Irene is a 31y o -year-old female who presents today for establishment of care  Zenaida Novak is "healthy," and is looking to est care with a PCP, as she has not ever had one  No hospitalization aside from childbirth, C section delivery  Zenaida Novak grew up moving around quite a bit, and completed her undergraduate education at [employer] and [employer]  Has a Masters in Counseling and Human Services  Has a little boy named Presley (4 y/a)   to her spouse Nikki Mayer  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   Couples and individual therapy  Drinks 1-5 drinks per weekend  No tobacco   Has medical MJ card for anxiety/PTSD, uses rarely  Sleep: 5-8h of uninterrupted sleep  Generally good quality sleep  This has only been the case for the past 1 5 months as her son has been sleeping better  Nutrition: avoids processed food/chips/cold cuts, eats a lot of salad, eats eggs, relatively little meat  May have a dairy sensitivity  Processed food makes her more anxious  Stress Mgmt: dog walks, has been exercising more regularly  Zenaida Novak explains that her attention to self-care has grown quite a bit  Feels safe in relationship        Grew up in a [employer] family, and endorses limited background in understanding medicine  MGM: stomach ca  Father: HTN  Review of Systems   Review of Systems   Constitutional: Negative for activity change, chills, fatigue and fever  HENT: Negative for congestion, sinus pressure, sinus pain and sore throat  Respiratory: Negative for cough, shortness of breath and wheezing  Cardiovascular: Negative for chest pain, palpitations and leg swelling  Gastrointestinal: Negative for abdominal pain, diarrhea, nausea and vomiting  Genitourinary: Negative for decreased urine volume, dysuria, frequency and urgency  Musculoskeletal: Negative for arthralgias, myalgias, neck pain and neck stiffness  Skin: Negative for rash  Neurological: Negative for dizziness, light-headedness, numbness and headaches  Active Problem List     Patient Active Problem List   Diagnosis    Annual physical exam       Past Medical History, Past Surgical History, Family History, and Social History were reviewed and updated today as appropriate  Objective   /70   Pulse 87   Temp 98 2 °F (36 8 °C)   Ht 5' 2" (1 575 m)   Wt 54 kg (119 lb)   SpO2 98%   BMI 21 77 kg/m²     Physical Exam  Constitutional:       General: She is not in acute distress  Appearance: She is well-developed  HENT:      Head: Normocephalic and atraumatic  Eyes:      Pupils: Pupils are equal, round, and reactive to light  Cardiovascular:      Rate and Rhythm: Normal rate and regular rhythm  Heart sounds: Normal heart sounds  No murmur heard  No friction rub  No gallop  Pulmonary:      Effort: Pulmonary effort is normal  No respiratory distress  Breath sounds: Normal breath sounds  No wheezing or rales  Abdominal:      General: There is no distension  Palpations: Abdomen is soft  Musculoskeletal:         General: Normal range of motion  Cervical back: Normal range of motion and neck supple     Neurological:      Mental Status: She is alert and oriented to person, place, and time  Psychiatric:         Behavior: Behavior normal          Thought Content: Thought content normal        Diabetic Foot Exam    Pertinent Laboratory/Diagnostic Studies:  No results found for: GLUCOSE, BUN, CREATININE, CALCIUM, NA, K, CO2, CL  No results found for: ALT, AST, GGT, ALKPHOS, BILITOT    Lab Results   Component Value Date    WBC 16 23 (H) 04/24/2018    HGB 11 6 04/24/2018    HCT 32 8 (L) 04/24/2018    MCV 94 04/24/2018     04/24/2018       No results found for: TSH    No results found for: CHOL  No results found for: TRIG  No results found for: HDL  No results found for: LDLCALC  No results found for: HGBA1C    Results for orders placed or performed in visit on 04/05/22   Liquid-based pap, screening   Result Value Ref Range    Case Report       Gynecologic Cytology Report                       Case: [id number]                                  Authorizing Provider:  Otis Guzman DO       Collected:           04/05/2022 1529              Ordering Location:     [facility]      Received:            04/05/2022 1529              First Screen:          Candido Aj                                                               Specimen:    LIQUID-BASED PAP, SCREENING, Cervix                                                        Primary Interpretation Negative for intraepithelial lesion or malignancy     Specimen Adequacy       Satisfactory for evaluation  Endocervical/transformation zone component present  Additional Information       [employer]'s FDA approved ,  and ThinPrep Imaging Duo System are utilized with strict adherence to the 's instruction manual to prepare gynecologic and non-gynecologic cytology specimens for the production of ThinPrep slides as well as for gynecologic ThinPrep imaging  These processes have been validated by our laboratory and/or by the     The Pap test is not a diagnostic procedure and should not be used as the sole means to detect cervical cancer  It is only a screening procedure to aid in the detection of cervical cancer and its precursors  Both false-negative and false-positive results have been experienced  Your patient's test result should be interpreted in this context together with the history and clinical findings  LMP 3/26/2022        Orders Placed This Encounter   Procedures    Hepatitis C Antibody ([facility])    Lipid Panel with Direct LDL reflex    Comprehensive metabolic panel         Current Medications     Current Outpatient Medications   Medication Sig Dispense Refill    Multiple Vitamin (multivitamin) tablet Take 1 tablet by mouth daily       No current facility-administered medications for this visit  ALLERGIES:  No Known Allergies    Health Maintenance     Health Maintenance   Topic Date Due    Hepatitis C Screening  Never done    Influenza Vaccine (Season Ended) 09/01/2022    Depression Screening  06/09/2023    BMI: Adult  06/09/2023    Annual Physical  06/09/2023    Cervical Cancer Screening  04/05/2027    DTaP,Tdap,and Td Vaccines (2 - Td or Tdap) 02/05/2028    HIV Screening  Completed    COVID-19 Vaccine  Completed    Pneumococcal Vaccine: Pediatrics (0 to 5 Years) and At-Risk Patients (6 to 59 Years)  Aged Out    HIB Vaccine  Aged Out    Hepatitis B Vaccine  Aged Out    IPV Vaccine  Aged Out    Hepatitis A Vaccine  Aged Out    Meningococcal ACWY Vaccine  Aged Out    HPV Vaccine  Aged [employer] History   Administered Date(s) Administered    COVID-19 PFIZER VACCINE 0 3 ML IM 01/15/2021, 02/04/2021, 10/21/2021    Influenza Quadrivalent Preservative Free 3 years and older IM 09/20/2017    MMR 04/24/2018    Tdap 02/05/2018         Margot Davis MD   [address]  6/9/2022  10:02 AM    Parts of this note were dictated using [employer] dictation software and may have sounds-like errors due to variation in pronunciation

## 2022-06-15 ENCOUNTER — APPOINTMENT (OUTPATIENT)
Dept: LAB | Facility: CLINIC | Age: 37
End: 2022-06-15
Payer: COMMERCIAL

## 2022-06-15 DIAGNOSIS — Z13.220 LIPID SCREENING: ICD-10-CM

## 2022-06-15 DIAGNOSIS — Z13.1 DIABETES MELLITUS SCREENING: ICD-10-CM

## 2022-06-15 DIAGNOSIS — Z11.59 NEED FOR HEPATITIS C SCREENING TEST: ICD-10-CM

## 2022-06-15 LAB
ALBUMIN SERPL BCP-MCNC: 4.3 G/DL (ref 3.5–5)
ALP SERPL-CCNC: 30 U/L (ref 46–116)
ALT SERPL W P-5'-P-CCNC: 26 U/L (ref 12–78)
ANION GAP SERPL CALCULATED.3IONS-SCNC: 5 MMOL/L (ref 4–13)
AST SERPL W P-5'-P-CCNC: 25 U/L (ref 5–45)
BILIRUB SERPL-MCNC: 0.94 MG/DL (ref 0.2–1)
BUN SERPL-MCNC: 10 MG/DL (ref 5–25)
CALCIUM SERPL-MCNC: 9.5 MG/DL (ref 8.3–10.1)
CHLORIDE SERPL-SCNC: 106 MMOL/L (ref 100–108)
CHOLEST SERPL-MCNC: 193 MG/DL
CO2 SERPL-SCNC: 28 MMOL/L (ref 21–32)
CREAT SERPL-MCNC: 0.71 MG/DL (ref 0.6–1.3)
GFR SERPL CREATININE-BSD FRML MDRD: 111 ML/MIN/1.73SQ M
GLUCOSE P FAST SERPL-MCNC: 105 MG/DL (ref 65–99)
HCV AB SER QL: NORMAL
HDLC SERPL-MCNC: 71 MG/DL
LDLC SERPL CALC-MCNC: 108 MG/DL (ref 0–100)
POTASSIUM SERPL-SCNC: 3.9 MMOL/L (ref 3.5–5.3)
PROT SERPL-MCNC: 7.7 G/DL (ref 6.4–8.2)
SODIUM SERPL-SCNC: 139 MMOL/L (ref 136–145)
TRIGL SERPL-MCNC: 68 MG/DL

## 2022-06-15 PROCEDURE — 36415 COLL VENOUS BLD VENIPUNCTURE: CPT

## 2022-06-15 PROCEDURE — 80061 LIPID PANEL: CPT

## 2022-06-15 PROCEDURE — 80053 COMPREHEN METABOLIC PANEL: CPT

## 2022-06-15 PROCEDURE — 86803 HEPATITIS C AB TEST: CPT

## 2022-10-28 NOTE — PROGRESS NOTES
Pt states no problems  Occas  Swelling in feet  Already had flu shot and is due for her TDAP shot   States she had her 28wk labs done last Friday (01/26/2018) at [facility] : Yes

## 2024-09-05 ENCOUNTER — ANNUAL EXAM (OUTPATIENT)
Dept: OBGYN CLINIC | Facility: CLINIC | Age: 39
End: 2024-09-05
Payer: COMMERCIAL

## 2024-09-05 VITALS
BODY MASS INDEX: 21.57 KG/M2 | SYSTOLIC BLOOD PRESSURE: 122 MMHG | WEIGHT: 117.2 LBS | HEIGHT: 62 IN | DIASTOLIC BLOOD PRESSURE: 80 MMHG

## 2024-09-05 DIAGNOSIS — Z01.419 ENCOUNTER FOR ANNUAL ROUTINE GYNECOLOGICAL EXAMINATION: Primary | ICD-10-CM

## 2024-09-05 PROCEDURE — S0612 ANNUAL GYNECOLOGICAL EXAMINA: HCPCS | Performed by: OBSTETRICS & GYNECOLOGY

## 2024-09-05 NOTE — PROGRESS NOTES
Ambulatory Visit  Name: Gabi Milligan      : 1985      MRN: [id number]  Encounter Provider: Krysta Arana DO  Encounter Date: 2024   Encounter department: [facility]    Assessment & Plan   1. Encounter for annual routine gynecological examination    Pap smear done as well as annual.  Encouraged self breast examination as well as calcium supplementation.  Reviewed breast cancer screening starting age 40 with baseline mammogram.  Return to office in 1 year or as needed    History of Present Illness     Gabi Milligan is a 38 y.o. female who presents     This a very pleasant 38-year-old female P1 ( x 1, age 8) presents for her GYN exam.  Her cycles are regular every 4 weeks lasting 5 days with no breakthrough bleeding.  She denies any changes in bowel or bladder function.  She has been in a monogamous relationship with her  for over 15 years.  Pap smears have been normal.    Method of contraception vasectomy.    Employed full-time therapist.    Review of Systems   Constitutional:  Negative for fatigue, fever and unexpected weight change.   Respiratory:  Negative for cough, chest tightness, shortness of breath and wheezing.    Cardiovascular: Negative.  Negative for chest pain and palpitations.   Gastrointestinal: Negative.  Negative for abdominal distention, abdominal pain, blood in stool, constipation, diarrhea, nausea and vomiting.   Genitourinary: Negative.  Negative for difficulty urinating, dyspareunia, dysuria, flank pain, frequency, genital sores, hematuria, pelvic pain, urgency, vaginal bleeding, vaginal discharge and vaginal pain.   Skin:  Negative for rash.     Current Outpatient Medications on File Prior to Visit   Medication Sig Dispense Refill    Multiple Vitamin (multivitamin) tablet Take 1 tablet by mouth daily      NON FORMULARY Nutrafol once a day       No current facility-administered medications on file prior to visit.      Objective     /80   Ht  "5' 2\" (1.575 m)   Wt 53.2 kg (117 lb 3.2 oz)   LMP 08/29/2024 (Exact Date)   BMI 21.44 kg/m²     Physical Exam  Constitutional:       Appearance: Normal appearance. She is well-developed.   HENT:      Head: Normocephalic and atraumatic.   Cardiovascular:      Rate and Rhythm: Normal rate and regular rhythm.   Pulmonary:      Effort: Pulmonary effort is normal.      Breath sounds: Normal breath sounds.   Chest:   Breasts:     Right: No inverted nipple, mass, nipple discharge, skin change or tenderness.      Left: No inverted nipple, mass, nipple discharge, skin change or tenderness.   Abdominal:      General: Bowel sounds are normal. There is no distension.      Palpations: Abdomen is soft.      Tenderness: There is no abdominal tenderness. There is no guarding or rebound.   Genitourinary:     Labia:         Right: No rash, tenderness or lesion.         Left: No rash, tenderness or lesion.       Vagina: Normal. No signs of injury. No vaginal discharge or tenderness.      Cervix: No cervical motion tenderness, discharge, friability, lesion or cervical bleeding.      Uterus: Not enlarged and not tender.       Adnexa:         Right: No mass, tenderness or fullness.          Left: No mass, tenderness or fullness.     Neurological:      Mental Status: She is alert.   Psychiatric:         Behavior: Behavior normal.       Administrative Statements   I have spent a total time of 24 minutes in caring for this patient on the day of the visit/encounter including Impressions, Counseling / Coordination of care, Documenting in the medical record, Reviewing / ordering tests, medicine, procedures  , and Obtaining or reviewing history  .        "

## 2024-09-09 LAB
CLINICAL INFO: NORMAL
CYTO CVX: NORMAL
DATE PREVIOUS BX: NORMAL
HPV E6+E7 MRNA CVX QL NAA+PROBE: NOT DETECTED
LMP START DATE: NORMAL
SL AMB PREV. PAP:: NORMAL
SPECIMEN SOURCE CVX/VAG CYTO: NORMAL

## (undated) DEVICE — SUT VICRYL 0 CT-1 36 IN J946H

## (undated) DEVICE — SUT VICRYL 4-0 KS 27 IN J662H

## (undated) DEVICE — PACK C-SECTION PBDS

## (undated) DEVICE — ABDOMINAL PAD: Brand: DERMACEA

## (undated) DEVICE — ADHESIVE SKN CLSR HISTOACRYL FLEX 0.5ML LF

## (undated) DEVICE — CHLORAPREP HI-LITE 26ML ORANGE

## (undated) DEVICE — SUT MONOCRYL 0 CTX 36 IN Y398H

## (undated) DEVICE — TELFA NON-ADHERENT ABSORBENT DRESSING: Brand: TELFA

## (undated) DEVICE — GAUZE SPONGES,16 PLY: Brand: CURITY

## (undated) DEVICE — Device

## (undated) DEVICE — GLOVE SRG BIOGEL ECLIPSE 6.5

## (undated) DEVICE — SKIN MARKER DUAL TIP WITH RULER CAP, FLEXIBLE RULER AND LABELS: Brand: DEVON

## (undated) DEVICE — GLOVE INDICATOR PI UNDERGLOVE SZ 7 BLUE